# Patient Record
Sex: FEMALE | Race: WHITE | Employment: OTHER | URBAN - METROPOLITAN AREA
[De-identification: names, ages, dates, MRNs, and addresses within clinical notes are randomized per-mention and may not be internally consistent; named-entity substitution may affect disease eponyms.]

---

## 2017-02-09 RX ORDER — LISINOPRIL 20 MG/1
TABLET ORAL
Qty: 90 TABLET | Refills: 1 | Status: SHIPPED | OUTPATIENT
Start: 2017-02-09 | End: 2017-08-30 | Stop reason: SDUPTHER

## 2017-02-09 RX ORDER — PIOGLITAZONEHYDROCHLORIDE 30 MG/1
TABLET ORAL
Qty: 90 TABLET | Refills: 1 | Status: SHIPPED | OUTPATIENT
Start: 2017-02-09 | End: 2018-03-30 | Stop reason: SDUPTHER

## 2017-02-15 ENCOUNTER — NURSE ONLY (OUTPATIENT)
Dept: FAMILY MEDICINE CLINIC | Age: 82
End: 2017-02-15

## 2017-02-15 DIAGNOSIS — D72.810 LYMPHOCYTOPENIA: ICD-10-CM

## 2017-02-15 DIAGNOSIS — E53.8 B12 DEFICIENCY: ICD-10-CM

## 2017-02-15 DIAGNOSIS — E55.9 VITAMIN D DEFICIENCY DISEASE: ICD-10-CM

## 2017-02-15 DIAGNOSIS — E11.9 TYPE 2 DIABETES MELLITUS WITHOUT COMPLICATION, WITHOUT LONG-TERM CURRENT USE OF INSULIN (HCC): ICD-10-CM

## 2017-02-15 LAB
A/G RATIO: 1.5 (ref 1.1–2.2)
ALBUMIN SERPL-MCNC: 3.9 G/DL (ref 3.4–5)
ALP BLD-CCNC: 42 U/L (ref 40–129)
ALT SERPL-CCNC: 16 U/L (ref 10–40)
ANION GAP SERPL CALCULATED.3IONS-SCNC: 14 MMOL/L (ref 3–16)
AST SERPL-CCNC: 13 U/L (ref 15–37)
BASOPHILS ABSOLUTE: 0 K/UL (ref 0–0.2)
BASOPHILS RELATIVE PERCENT: 0.5 %
BILIRUB SERPL-MCNC: <0.2 MG/DL (ref 0–1)
BUN BLDV-MCNC: 16 MG/DL (ref 7–20)
CALCIUM SERPL-MCNC: 9.7 MG/DL (ref 8.3–10.6)
CHLORIDE BLD-SCNC: 101 MMOL/L (ref 99–110)
CHOLESTEROL, TOTAL: 107 MG/DL (ref 0–199)
CO2: 25 MMOL/L (ref 21–32)
CREAT SERPL-MCNC: 0.9 MG/DL (ref 0.6–1.2)
EOSINOPHILS ABSOLUTE: 0.1 K/UL (ref 0–0.6)
EOSINOPHILS RELATIVE PERCENT: 2.8 %
GFR AFRICAN AMERICAN: >60
GFR NON-AFRICAN AMERICAN: >60
GLOBULIN: 2.6 G/DL
GLUCOSE BLD-MCNC: 174 MG/DL (ref 70–99)
HCT VFR BLD CALC: 36.9 % (ref 36–48)
HDLC SERPL-MCNC: 46 MG/DL (ref 40–60)
HEMOGLOBIN: 12 G/DL (ref 12–16)
LDL CHOLESTEROL CALCULATED: 44 MG/DL
LYMPHOCYTES ABSOLUTE: 0.8 K/UL (ref 1–5.1)
LYMPHOCYTES RELATIVE PERCENT: 23.3 %
MCH RBC QN AUTO: 30.4 PG (ref 26–34)
MCHC RBC AUTO-ENTMCNC: 32.5 G/DL (ref 31–36)
MCV RBC AUTO: 93.7 FL (ref 80–100)
MONOCYTES ABSOLUTE: 0.3 K/UL (ref 0–1.3)
MONOCYTES RELATIVE PERCENT: 8.2 %
NEUTROPHILS ABSOLUTE: 2.3 K/UL (ref 1.7–7.7)
NEUTROPHILS RELATIVE PERCENT: 65.2 %
PDW BLD-RTO: 13.8 % (ref 12.4–15.4)
PLATELET # BLD: 181 K/UL (ref 135–450)
PMV BLD AUTO: 10.4 FL (ref 5–10.5)
POTASSIUM SERPL-SCNC: 5.3 MMOL/L (ref 3.5–5.1)
RBC # BLD: 3.94 M/UL (ref 4–5.2)
SODIUM BLD-SCNC: 140 MMOL/L (ref 136–145)
TOTAL PROTEIN: 6.5 G/DL (ref 6.4–8.2)
TRIGL SERPL-MCNC: 83 MG/DL (ref 0–150)
VITAMIN B-12: 298 PG/ML (ref 211–911)
VITAMIN D 25-HYDROXY: 26.8 NG/ML
VLDLC SERPL CALC-MCNC: 17 MG/DL
WBC # BLD: 3.5 K/UL (ref 4–11)

## 2017-02-15 PROCEDURE — 36415 COLL VENOUS BLD VENIPUNCTURE: CPT | Performed by: FAMILY MEDICINE

## 2017-02-16 LAB
ESTIMATED AVERAGE GLUCOSE: 168.6 MG/DL
HBA1C MFR BLD: 7.5 %

## 2017-02-20 ENCOUNTER — OFFICE VISIT (OUTPATIENT)
Dept: FAMILY MEDICINE CLINIC | Age: 82
End: 2017-02-20

## 2017-02-20 VITALS
WEIGHT: 161 LBS | DIASTOLIC BLOOD PRESSURE: 72 MMHG | HEART RATE: 62 BPM | BODY MASS INDEX: 26.82 KG/M2 | HEIGHT: 65 IN | SYSTOLIC BLOOD PRESSURE: 128 MMHG | OXYGEN SATURATION: 98 % | TEMPERATURE: 97.8 F

## 2017-02-20 DIAGNOSIS — J20.9 ACUTE BRONCHITIS, UNSPECIFIED ORGANISM: ICD-10-CM

## 2017-02-20 DIAGNOSIS — D72.810 LYMPHOCYTOPENIA: ICD-10-CM

## 2017-02-20 DIAGNOSIS — I10 BENIGN ESSENTIAL HYPERTENSION: ICD-10-CM

## 2017-02-20 DIAGNOSIS — E11.9 TYPE 2 DIABETES MELLITUS WITHOUT COMPLICATION, WITHOUT LONG-TERM CURRENT USE OF INSULIN (HCC): Primary | ICD-10-CM

## 2017-02-20 DIAGNOSIS — E78.00 PURE HYPERCHOLESTEROLEMIA: ICD-10-CM

## 2017-02-20 DIAGNOSIS — F51.04 INSOMNIA, PSYCHOPHYSIOLOGICAL: ICD-10-CM

## 2017-02-20 DIAGNOSIS — E55.9 VITAMIN D DEFICIENCY DISEASE: ICD-10-CM

## 2017-02-20 PROCEDURE — 99214 OFFICE O/P EST MOD 30 MIN: CPT | Performed by: FAMILY MEDICINE

## 2017-02-20 PROCEDURE — G8510 SCR DEP NEG, NO PLAN REQD: HCPCS | Performed by: FAMILY MEDICINE

## 2017-02-20 RX ORDER — AZITHROMYCIN 250 MG/1
TABLET, FILM COATED ORAL
Qty: 1 PACKET | Refills: 0 | Status: SHIPPED | OUTPATIENT
Start: 2017-02-20 | End: 2017-03-02

## 2017-02-20 ASSESSMENT — PATIENT HEALTH QUESTIONNAIRE - PHQ9
SUM OF ALL RESPONSES TO PHQ QUESTIONS 1-9: 0
2. FEELING DOWN, DEPRESSED OR HOPELESS: 0
1. LITTLE INTEREST OR PLEASURE IN DOING THINGS: 0
SUM OF ALL RESPONSES TO PHQ9 QUESTIONS 1 & 2: 0

## 2017-03-03 RX ORDER — SIMVASTATIN 20 MG
TABLET ORAL
Qty: 90 TABLET | Refills: 1 | Status: SHIPPED | OUTPATIENT
Start: 2017-03-03 | End: 2017-10-02 | Stop reason: SDUPTHER

## 2017-05-23 RX ORDER — ATENOLOL 25 MG/1
TABLET ORAL
Qty: 90 TABLET | Refills: 1 | Status: SHIPPED | OUTPATIENT
Start: 2017-05-23 | End: 2017-12-07 | Stop reason: SDUPTHER

## 2017-05-23 RX ORDER — GLIMEPIRIDE 2 MG/1
TABLET ORAL
Qty: 180 TABLET | Refills: 1 | Status: SHIPPED | OUTPATIENT
Start: 2017-05-23 | End: 2017-12-07 | Stop reason: SDUPTHER

## 2017-06-13 ENCOUNTER — NURSE ONLY (OUTPATIENT)
Dept: FAMILY MEDICINE CLINIC | Age: 82
End: 2017-06-13

## 2017-06-13 DIAGNOSIS — D72.810 LYMPHOCYTOPENIA: ICD-10-CM

## 2017-06-13 DIAGNOSIS — E11.9 TYPE 2 DIABETES MELLITUS WITHOUT COMPLICATION, WITHOUT LONG-TERM CURRENT USE OF INSULIN (HCC): ICD-10-CM

## 2017-06-13 DIAGNOSIS — E55.9 VITAMIN D DEFICIENCY DISEASE: ICD-10-CM

## 2017-06-13 LAB
BASOPHILS ABSOLUTE: 0 K/UL (ref 0–0.2)
BASOPHILS RELATIVE PERCENT: 1 %
EOSINOPHILS ABSOLUTE: 0.1 K/UL (ref 0–0.6)
EOSINOPHILS RELATIVE PERCENT: 1.8 %
HCT VFR BLD CALC: 38.3 % (ref 36–48)
HEMOGLOBIN: 12.1 G/DL (ref 12–16)
LYMPHOCYTES ABSOLUTE: 0.9 K/UL (ref 1–5.1)
LYMPHOCYTES RELATIVE PERCENT: 20.4 %
MCH RBC QN AUTO: 30.5 PG (ref 26–34)
MCHC RBC AUTO-ENTMCNC: 31.7 G/DL (ref 31–36)
MCV RBC AUTO: 96.5 FL (ref 80–100)
MONOCYTES ABSOLUTE: 0.3 K/UL (ref 0–1.3)
MONOCYTES RELATIVE PERCENT: 6 %
NEUTROPHILS ABSOLUTE: 3 K/UL (ref 1.7–7.7)
NEUTROPHILS RELATIVE PERCENT: 70.8 %
PDW BLD-RTO: 14.1 % (ref 12.4–15.4)
PLATELET # BLD: 194 K/UL (ref 135–450)
PMV BLD AUTO: 10.5 FL (ref 5–10.5)
RBC # BLD: 3.97 M/UL (ref 4–5.2)
VITAMIN B-12: 292 PG/ML (ref 211–911)
VITAMIN D 25-HYDROXY: 26.9 NG/ML
WBC # BLD: 4.2 K/UL (ref 4–11)

## 2017-06-13 PROCEDURE — 36415 COLL VENOUS BLD VENIPUNCTURE: CPT | Performed by: FAMILY MEDICINE

## 2017-06-14 LAB
ESTIMATED AVERAGE GLUCOSE: 159.9 MG/DL
HBA1C MFR BLD: 7.2 %

## 2017-06-19 ENCOUNTER — OFFICE VISIT (OUTPATIENT)
Dept: FAMILY MEDICINE CLINIC | Age: 82
End: 2017-06-19

## 2017-06-19 VITALS
HEIGHT: 65 IN | TEMPERATURE: 97.8 F | OXYGEN SATURATION: 96 % | HEART RATE: 68 BPM | BODY MASS INDEX: 27.49 KG/M2 | WEIGHT: 165 LBS | DIASTOLIC BLOOD PRESSURE: 70 MMHG | SYSTOLIC BLOOD PRESSURE: 132 MMHG

## 2017-06-19 DIAGNOSIS — E55.9 VITAMIN D DEFICIENCY DISEASE: ICD-10-CM

## 2017-06-19 DIAGNOSIS — I10 BENIGN ESSENTIAL HYPERTENSION: Primary | ICD-10-CM

## 2017-06-19 DIAGNOSIS — E11.9 TYPE 2 DIABETES MELLITUS WITHOUT COMPLICATION, WITHOUT LONG-TERM CURRENT USE OF INSULIN (HCC): ICD-10-CM

## 2017-06-19 DIAGNOSIS — E53.8 B12 DEFICIENCY: ICD-10-CM

## 2017-06-19 DIAGNOSIS — F51.04 INSOMNIA, PSYCHOPHYSIOLOGICAL: ICD-10-CM

## 2017-06-19 PROCEDURE — 3288F FALL RISK ASSESSMENT DOCD: CPT | Performed by: FAMILY MEDICINE

## 2017-06-19 PROCEDURE — 99214 OFFICE O/P EST MOD 30 MIN: CPT | Performed by: FAMILY MEDICINE

## 2017-08-14 ENCOUNTER — OFFICE VISIT (OUTPATIENT)
Dept: FAMILY MEDICINE CLINIC | Age: 82
End: 2017-08-14

## 2017-08-14 VITALS
HEART RATE: 80 BPM | SYSTOLIC BLOOD PRESSURE: 136 MMHG | DIASTOLIC BLOOD PRESSURE: 62 MMHG | BODY MASS INDEX: 28.05 KG/M2 | OXYGEN SATURATION: 93 % | WEIGHT: 166 LBS | TEMPERATURE: 97.6 F

## 2017-08-14 DIAGNOSIS — N30.00 ACUTE CYSTITIS WITHOUT HEMATURIA: ICD-10-CM

## 2017-08-14 DIAGNOSIS — R55 SYNCOPE, UNSPECIFIED SYNCOPE TYPE: ICD-10-CM

## 2017-08-14 DIAGNOSIS — E11.9 TYPE 2 DIABETES MELLITUS WITHOUT COMPLICATION, WITHOUT LONG-TERM CURRENT USE OF INSULIN (HCC): ICD-10-CM

## 2017-08-14 DIAGNOSIS — D62 ACUTE BLOOD LOSS ANEMIA: ICD-10-CM

## 2017-08-14 DIAGNOSIS — K92.2 GASTROINTESTINAL HEMORRHAGE, UNSPECIFIED GASTROINTESTINAL HEMORRHAGE TYPE: Primary | ICD-10-CM

## 2017-08-14 DIAGNOSIS — R26.89 IMBALANCE: ICD-10-CM

## 2017-08-14 LAB
BASOPHILS ABSOLUTE: 0 K/UL (ref 0–0.2)
BASOPHILS RELATIVE PERCENT: 0.5 %
EOSINOPHILS ABSOLUTE: 0.1 K/UL (ref 0–0.6)
EOSINOPHILS RELATIVE PERCENT: 1.8 %
HCT VFR BLD CALC: 34.8 % (ref 36–48)
HEMOGLOBIN: 11.5 G/DL (ref 12–16)
LYMPHOCYTES ABSOLUTE: 1.4 K/UL (ref 1–5.1)
LYMPHOCYTES RELATIVE PERCENT: 30.5 %
MCH RBC QN AUTO: 31.1 PG (ref 26–34)
MCHC RBC AUTO-ENTMCNC: 33.2 G/DL (ref 31–36)
MCV RBC AUTO: 93.6 FL (ref 80–100)
MONOCYTES ABSOLUTE: 0.6 K/UL (ref 0–1.3)
MONOCYTES RELATIVE PERCENT: 12.9 %
NEUTROPHILS ABSOLUTE: 2.5 K/UL (ref 1.7–7.7)
NEUTROPHILS RELATIVE PERCENT: 54.3 %
PDW BLD-RTO: 13.6 % (ref 12.4–15.4)
PLATELET # BLD: 233 K/UL (ref 135–450)
PMV BLD AUTO: 10 FL (ref 5–10.5)
RBC # BLD: 3.72 M/UL (ref 4–5.2)
WBC # BLD: 4.5 K/UL (ref 4–11)

## 2017-08-14 PROCEDURE — 36415 COLL VENOUS BLD VENIPUNCTURE: CPT | Performed by: FAMILY MEDICINE

## 2017-08-14 PROCEDURE — 99214 OFFICE O/P EST MOD 30 MIN: CPT | Performed by: FAMILY MEDICINE

## 2017-08-14 RX ORDER — OMEPRAZOLE 20 MG/1
20 CAPSULE, DELAYED RELEASE ORAL DAILY
COMMUNITY
Start: 2017-08-10 | End: 2017-10-23 | Stop reason: ALTCHOICE

## 2017-08-16 ENCOUNTER — TELEPHONE (OUTPATIENT)
Dept: FAMILY MEDICINE CLINIC | Age: 82
End: 2017-08-16

## 2017-08-16 RX ORDER — FERROUS SULFATE 325(65) MG
325 TABLET ORAL
Qty: 30 TABLET | Refills: 0 | COMMUNITY
Start: 2017-08-16 | End: 2018-02-27 | Stop reason: ALTCHOICE

## 2017-08-28 ENCOUNTER — TELEPHONE (OUTPATIENT)
Dept: FAMILY MEDICINE CLINIC | Age: 82
End: 2017-08-28

## 2017-08-28 DIAGNOSIS — R26.89 IMBALANCE: ICD-10-CM

## 2017-08-28 DIAGNOSIS — R26.81 UNSTEADY GAIT: Primary | ICD-10-CM

## 2017-08-30 RX ORDER — LISINOPRIL 20 MG/1
TABLET ORAL
Qty: 90 TABLET | Refills: 1 | Status: SHIPPED | OUTPATIENT
Start: 2017-08-30 | End: 2018-02-26 | Stop reason: SDUPTHER

## 2017-08-31 ENCOUNTER — HOSPITAL ENCOUNTER (OUTPATIENT)
Dept: OTHER | Age: 82
Discharge: HOME | End: 2017-08-31
Attending: INTERNAL MEDICINE | Admitting: INTERNAL MEDICINE

## 2017-08-31 VITALS
SYSTOLIC BLOOD PRESSURE: 123 MMHG | HEIGHT: 66 IN | OXYGEN SATURATION: 93 % | WEIGHT: 166 LBS | HEART RATE: 64 BPM | TEMPERATURE: 97.9 F | RESPIRATION RATE: 16 BRPM | DIASTOLIC BLOOD PRESSURE: 59 MMHG | BODY MASS INDEX: 26.68 KG/M2

## 2017-08-31 LAB
C DIFFICILE TOXIN, EIA: NORMAL
GLUCOSE BLD-MCNC: 131 MG/DL (ref 70–99)
GLUCOSE BLD-MCNC: 141 MG/DL (ref 70–99)
PERFORMED ON: ABNORMAL
PERFORMED ON: ABNORMAL

## 2017-08-31 RX ORDER — SODIUM CHLORIDE, SODIUM LACTATE, POTASSIUM CHLORIDE, CALCIUM CHLORIDE 600; 310; 30; 20 MG/100ML; MG/100ML; MG/100ML; MG/100ML
INJECTION, SOLUTION INTRAVENOUS CONTINUOUS
Status: DISCONTINUED | OUTPATIENT
Start: 2017-08-31 | End: 2017-09-01 | Stop reason: HOSPADM

## 2017-08-31 RX ADMIN — SODIUM CHLORIDE, SODIUM LACTATE, POTASSIUM CHLORIDE, CALCIUM CHLORIDE: 600; 310; 30; 20 INJECTION, SOLUTION INTRAVENOUS at 06:41

## 2017-08-31 ASSESSMENT — PAIN - FUNCTIONAL ASSESSMENT: PAIN_FUNCTIONAL_ASSESSMENT: 0-10

## 2017-10-02 RX ORDER — SIMVASTATIN 20 MG
TABLET ORAL
Qty: 90 TABLET | Refills: 1 | Status: SHIPPED | OUTPATIENT
Start: 2017-10-02 | End: 2018-04-23 | Stop reason: SDUPTHER

## 2017-10-19 ENCOUNTER — NURSE ONLY (OUTPATIENT)
Dept: FAMILY MEDICINE CLINIC | Age: 82
End: 2017-10-19

## 2017-10-19 DIAGNOSIS — E11.9 TYPE 2 DIABETES MELLITUS WITHOUT COMPLICATION, WITHOUT LONG-TERM CURRENT USE OF INSULIN (HCC): ICD-10-CM

## 2017-10-19 LAB
A/G RATIO: 1.5 (ref 1.1–2.2)
ALBUMIN SERPL-MCNC: 4.1 G/DL (ref 3.4–5)
ALP BLD-CCNC: 44 U/L (ref 40–129)
ALT SERPL-CCNC: 13 U/L (ref 10–40)
ANION GAP SERPL CALCULATED.3IONS-SCNC: 13 MMOL/L (ref 3–16)
AST SERPL-CCNC: 11 U/L (ref 15–37)
BILIRUB SERPL-MCNC: <0.2 MG/DL (ref 0–1)
BUN BLDV-MCNC: 19 MG/DL (ref 7–20)
CALCIUM SERPL-MCNC: 9.6 MG/DL (ref 8.3–10.6)
CHLORIDE BLD-SCNC: 98 MMOL/L (ref 99–110)
CHOLESTEROL, TOTAL: 159 MG/DL (ref 0–199)
CO2: 27 MMOL/L (ref 21–32)
CREAT SERPL-MCNC: 0.7 MG/DL (ref 0.6–1.2)
GFR AFRICAN AMERICAN: >60
GFR NON-AFRICAN AMERICAN: >60
GLOBULIN: 2.7 G/DL
GLUCOSE BLD-MCNC: 166 MG/DL (ref 70–99)
HDLC SERPL-MCNC: 48 MG/DL (ref 40–60)
LDL CHOLESTEROL CALCULATED: 74 MG/DL
POTASSIUM SERPL-SCNC: 4.5 MMOL/L (ref 3.5–5.1)
SODIUM BLD-SCNC: 138 MMOL/L (ref 136–145)
TOTAL PROTEIN: 6.8 G/DL (ref 6.4–8.2)
TRIGL SERPL-MCNC: 186 MG/DL (ref 0–150)
VLDLC SERPL CALC-MCNC: 37 MG/DL

## 2017-10-19 PROCEDURE — 36415 COLL VENOUS BLD VENIPUNCTURE: CPT | Performed by: FAMILY MEDICINE

## 2017-10-20 LAB
ESTIMATED AVERAGE GLUCOSE: 180 MG/DL
HBA1C MFR BLD: 7.9 %

## 2017-10-23 ENCOUNTER — OFFICE VISIT (OUTPATIENT)
Dept: FAMILY MEDICINE CLINIC | Age: 82
End: 2017-10-23

## 2017-10-23 VITALS
BODY MASS INDEX: 27.14 KG/M2 | SYSTOLIC BLOOD PRESSURE: 135 MMHG | HEART RATE: 72 BPM | DIASTOLIC BLOOD PRESSURE: 64 MMHG | HEIGHT: 64 IN | WEIGHT: 159 LBS

## 2017-10-23 DIAGNOSIS — F51.04 INSOMNIA, PSYCHOPHYSIOLOGICAL: ICD-10-CM

## 2017-10-23 DIAGNOSIS — E55.9 VITAMIN D DEFICIENCY DISEASE: ICD-10-CM

## 2017-10-23 DIAGNOSIS — K52.9 COLITIS: ICD-10-CM

## 2017-10-23 DIAGNOSIS — D48.5 NEOPLASM OF UNCERTAIN BEHAVIOR OF SKIN: ICD-10-CM

## 2017-10-23 DIAGNOSIS — E78.00 PURE HYPERCHOLESTEROLEMIA: ICD-10-CM

## 2017-10-23 DIAGNOSIS — I10 BENIGN ESSENTIAL HYPERTENSION: ICD-10-CM

## 2017-10-23 DIAGNOSIS — E11.9 TYPE 2 DIABETES MELLITUS WITHOUT COMPLICATION, WITHOUT LONG-TERM CURRENT USE OF INSULIN (HCC): Primary | ICD-10-CM

## 2017-10-23 PROCEDURE — 99214 OFFICE O/P EST MOD 30 MIN: CPT | Performed by: FAMILY MEDICINE

## 2017-10-23 NOTE — PROGRESS NOTES
Assessment and plan  1. Type 2 diabetes mellitus without complication, without long-term current use of insulin ([facility]) Stable     2. Benign essential hypertension Stable     3. Colitis Resolved     4. Insomnia, psychophysiological Resolved     5. Neoplasm of uncertain behavior of skin  Return for excision and biopsy    6. Pure hypercholesterolemiastable    Healthy Family prevention recommendations given. Continue all current prescription medications as listed below. RTC 4 months or sooner prn. She will have a CBC, A1c, B12, and vitamin D level drawn prior to that visit    Subjective  Patient returns for reevaluation of her medical problems including diabetes, hypertension, insomnia, hypercholesterolemia, and colitis. Her recent A1c was 7.9%. Her blood pressures been good. Her recent lipid was at goal.  Her insomnia has resolved and she is no longer needing any trazodone. Her foot exam was done at last visit but was not recorded in health maintenance. She is concerned about a lesion on her left upper chest.  It does get irritated and she believes it is enlarging. She was referred to home care for physical therapy. She cannot leave the house without assistance from her daughters. She reports the physical therapy is definitely helping to strengthen her legs. Social History   Substance Use Topics    Smoking status: Never Smoker    Smokeless tobacco: Never Used    Alcohol use No     Past history:  She did have her colonoscopy on August 31. It showed evidence of resolving sigmoid colitis. She's had no further bowel trouble. She saw her eye doctor in January.     Review of systems:  Constitutional:  fatigue - no                                                  abnormal weight loss - no  Neurologic:  dizziness - no                       syncope - no  Cardiac:  chest pain or discomfort - no                 orthopnea or PND - no  Respiratory: wheezing - no                       dyspnea on

## 2017-10-30 ENCOUNTER — TELEPHONE (OUTPATIENT)
Dept: FAMILY MEDICINE CLINIC | Age: 82
End: 2017-10-30

## 2017-12-07 RX ORDER — ATENOLOL 25 MG/1
TABLET ORAL
Qty: 90 TABLET | Refills: 1 | Status: SHIPPED | OUTPATIENT
Start: 2017-12-07 | End: 2018-05-25 | Stop reason: SDUPTHER

## 2017-12-07 RX ORDER — GLIMEPIRIDE 2 MG/1
TABLET ORAL
Qty: 180 TABLET | Refills: 1 | Status: SHIPPED | OUTPATIENT
Start: 2017-12-07 | End: 2018-06-18 | Stop reason: SDUPTHER

## 2017-12-18 ENCOUNTER — TELEPHONE (OUTPATIENT)
Dept: FAMILY MEDICINE CLINIC | Age: 82
End: 2017-12-18

## 2018-02-22 ENCOUNTER — NURSE ONLY (OUTPATIENT)
Dept: FAMILY MEDICINE CLINIC | Age: 83
End: 2018-02-22

## 2018-02-22 DIAGNOSIS — E55.9 VITAMIN D DEFICIENCY DISEASE: ICD-10-CM

## 2018-02-22 DIAGNOSIS — E11.9 TYPE 2 DIABETES MELLITUS WITHOUT COMPLICATION, WITHOUT LONG-TERM CURRENT USE OF INSULIN (HCC): ICD-10-CM

## 2018-02-22 LAB
BASOPHILS ABSOLUTE: 0 K/UL (ref 0–0.2)
BASOPHILS RELATIVE PERCENT: 0.7 %
EOSINOPHILS ABSOLUTE: 0.1 K/UL (ref 0–0.6)
EOSINOPHILS RELATIVE PERCENT: 2.3 %
HCT VFR BLD CALC: 36.3 % (ref 36–48)
HEMOGLOBIN: 12.2 G/DL (ref 12–16)
LYMPHOCYTES ABSOLUTE: 1.1 K/UL (ref 1–5.1)
LYMPHOCYTES RELATIVE PERCENT: 24.3 %
MCH RBC QN AUTO: 31.4 PG (ref 26–34)
MCHC RBC AUTO-ENTMCNC: 33.7 G/DL (ref 31–36)
MCV RBC AUTO: 93.2 FL (ref 80–100)
MONOCYTES ABSOLUTE: 0.3 K/UL (ref 0–1.3)
MONOCYTES RELATIVE PERCENT: 6.2 %
NEUTROPHILS ABSOLUTE: 2.9 K/UL (ref 1.7–7.7)
NEUTROPHILS RELATIVE PERCENT: 66.5 %
PDW BLD-RTO: 14.5 % (ref 12.4–15.4)
PLATELET # BLD: 185 K/UL (ref 135–450)
PMV BLD AUTO: 10.3 FL (ref 5–10.5)
RBC # BLD: 3.89 M/UL (ref 4–5.2)
VITAMIN B-12: 221 PG/ML (ref 211–911)
VITAMIN D 25-HYDROXY: 23.1 NG/ML
WBC # BLD: 4.4 K/UL (ref 4–11)

## 2018-02-22 PROCEDURE — 36415 COLL VENOUS BLD VENIPUNCTURE: CPT | Performed by: FAMILY MEDICINE

## 2018-02-23 LAB
ESTIMATED AVERAGE GLUCOSE: 180 MG/DL
HBA1C MFR BLD: 7.9 %

## 2018-02-26 RX ORDER — LISINOPRIL 20 MG/1
TABLET ORAL
Qty: 90 TABLET | Refills: 1 | Status: SHIPPED | OUTPATIENT
Start: 2018-02-26 | End: 2018-07-09 | Stop reason: SDUPTHER

## 2018-02-27 ENCOUNTER — OFFICE VISIT (OUTPATIENT)
Dept: FAMILY MEDICINE CLINIC | Age: 83
End: 2018-02-27

## 2018-02-27 VITALS
BODY MASS INDEX: 28.78 KG/M2 | TEMPERATURE: 97.6 F | HEART RATE: 72 BPM | DIASTOLIC BLOOD PRESSURE: 60 MMHG | WEIGHT: 169 LBS | OXYGEN SATURATION: 97 % | SYSTOLIC BLOOD PRESSURE: 126 MMHG

## 2018-02-27 DIAGNOSIS — D72.810 LYMPHOCYTOPENIA: ICD-10-CM

## 2018-02-27 DIAGNOSIS — I10 BENIGN ESSENTIAL HYPERTENSION: ICD-10-CM

## 2018-02-27 DIAGNOSIS — E11.9 TYPE 2 DIABETES MELLITUS WITHOUT COMPLICATION, WITHOUT LONG-TERM CURRENT USE OF INSULIN (HCC): Primary | ICD-10-CM

## 2018-02-27 DIAGNOSIS — E53.8 B12 DEFICIENCY: ICD-10-CM

## 2018-02-27 DIAGNOSIS — E55.9 VITAMIN D DEFICIENCY DISEASE: ICD-10-CM

## 2018-02-27 PROCEDURE — G8510 SCR DEP NEG, NO PLAN REQD: HCPCS | Performed by: FAMILY MEDICINE

## 2018-02-27 PROCEDURE — 99214 OFFICE O/P EST MOD 30 MIN: CPT | Performed by: FAMILY MEDICINE

## 2018-02-27 RX ORDER — ACETAMINOPHEN 160 MG
2000 TABLET,DISINTEGRATING ORAL DAILY
Qty: 1 CAPSULE | Refills: 0 | Status: ON HOLD
Start: 2018-02-27 | End: 2019-01-20 | Stop reason: ALTCHOICE

## 2018-02-27 ASSESSMENT — PATIENT HEALTH QUESTIONNAIRE - PHQ9
SUM OF ALL RESPONSES TO PHQ9 QUESTIONS 1 & 2: 0
1. LITTLE INTEREST OR PLEASURE IN DOING THINGS: 0
SUM OF ALL RESPONSES TO PHQ QUESTIONS 1-9: 0
2. FEELING DOWN, DEPRESSED OR HOPELESS: 0

## 2018-03-31 RX ORDER — PIOGLITAZONEHYDROCHLORIDE 30 MG/1
TABLET ORAL
Qty: 90 TABLET | Refills: 1 | Status: SHIPPED | OUTPATIENT
Start: 2018-03-31 | End: 2018-07-09 | Stop reason: SDUPTHER

## 2018-04-23 RX ORDER — SIMVASTATIN 20 MG
TABLET ORAL
Qty: 90 TABLET | Refills: 1 | Status: SHIPPED | OUTPATIENT
Start: 2018-04-23 | End: 2018-07-09 | Stop reason: SDUPTHER

## 2018-04-24 RX ORDER — TRAZODONE HYDROCHLORIDE 50 MG/1
TABLET ORAL
Qty: 60 TABLET | Refills: 1 | OUTPATIENT
Start: 2018-04-24

## 2018-05-25 RX ORDER — ATENOLOL 25 MG/1
TABLET ORAL
Qty: 90 TABLET | Refills: 1 | Status: SHIPPED | OUTPATIENT
Start: 2018-05-25 | End: 2018-07-09 | Stop reason: SDUPTHER

## 2018-06-18 RX ORDER — GLIMEPIRIDE 2 MG/1
TABLET ORAL
Qty: 180 TABLET | Refills: 1 | Status: SHIPPED | OUTPATIENT
Start: 2018-06-18 | End: 2018-12-20 | Stop reason: SDUPTHER

## 2018-07-09 ENCOUNTER — OFFICE VISIT (OUTPATIENT)
Dept: FAMILY MEDICINE CLINIC | Age: 83
End: 2018-07-09

## 2018-07-09 VITALS
WEIGHT: 173.6 LBS | BODY MASS INDEX: 29.57 KG/M2 | DIASTOLIC BLOOD PRESSURE: 60 MMHG | TEMPERATURE: 97.4 F | SYSTOLIC BLOOD PRESSURE: 128 MMHG | HEART RATE: 70 BPM | OXYGEN SATURATION: 97 %

## 2018-07-09 DIAGNOSIS — I10 BENIGN ESSENTIAL HYPERTENSION: ICD-10-CM

## 2018-07-09 DIAGNOSIS — F51.04 PSYCHOPHYSIOLOGIC INSOMNIA: ICD-10-CM

## 2018-07-09 DIAGNOSIS — E11.9 TYPE 2 DIABETES MELLITUS WITHOUT COMPLICATION, WITHOUT LONG-TERM CURRENT USE OF INSULIN (HCC): Primary | ICD-10-CM

## 2018-07-09 DIAGNOSIS — E55.9 VITAMIN D DEFICIENCY DISEASE: ICD-10-CM

## 2018-07-09 DIAGNOSIS — E78.00 PURE HYPERCHOLESTEROLEMIA: ICD-10-CM

## 2018-07-09 LAB
A/G RATIO: 1.6 (ref 1.1–2.2)
ALBUMIN SERPL-MCNC: 4.2 G/DL (ref 3.4–5)
ALP BLD-CCNC: 43 U/L (ref 40–129)
ALT SERPL-CCNC: 11 U/L (ref 10–40)
ANION GAP SERPL CALCULATED.3IONS-SCNC: 14 MMOL/L (ref 3–16)
AST SERPL-CCNC: 13 U/L (ref 15–37)
BASOPHILS ABSOLUTE: 0 K/UL (ref 0–0.2)
BASOPHILS RELATIVE PERCENT: 1 %
BILIRUB SERPL-MCNC: <0.2 MG/DL (ref 0–1)
BUN BLDV-MCNC: 22 MG/DL (ref 7–20)
CALCIUM SERPL-MCNC: 9.8 MG/DL (ref 8.3–10.6)
CHLORIDE BLD-SCNC: 102 MMOL/L (ref 99–110)
CHOLESTEROL, TOTAL: 136 MG/DL (ref 0–199)
CO2: 25 MMOL/L (ref 21–32)
CREAT SERPL-MCNC: 0.9 MG/DL (ref 0.6–1.2)
EOSINOPHILS ABSOLUTE: 0.1 K/UL (ref 0–0.6)
EOSINOPHILS RELATIVE PERCENT: 1.5 %
GFR AFRICAN AMERICAN: >60
GFR NON-AFRICAN AMERICAN: >60
GLOBULIN: 2.7 G/DL
GLUCOSE BLD-MCNC: 164 MG/DL (ref 70–99)
HBA1C MFR BLD: 7.3 %
HCT VFR BLD CALC: 35.8 % (ref 36–48)
HDLC SERPL-MCNC: 56 MG/DL (ref 40–60)
HEMOGLOBIN: 12.2 G/DL (ref 12–16)
LDL CHOLESTEROL CALCULATED: 61 MG/DL
LYMPHOCYTES ABSOLUTE: 0.8 K/UL (ref 1–5.1)
LYMPHOCYTES RELATIVE PERCENT: 17.3 %
MCH RBC QN AUTO: 31.8 PG (ref 26–34)
MCHC RBC AUTO-ENTMCNC: 34.1 G/DL (ref 31–36)
MCV RBC AUTO: 93.4 FL (ref 80–100)
MONOCYTES ABSOLUTE: 0.2 K/UL (ref 0–1.3)
MONOCYTES RELATIVE PERCENT: 5 %
NEUTROPHILS ABSOLUTE: 3.6 K/UL (ref 1.7–7.7)
NEUTROPHILS RELATIVE PERCENT: 75.2 %
PDW BLD-RTO: 14.1 % (ref 12.4–15.4)
PLATELET # BLD: 187 K/UL (ref 135–450)
PMV BLD AUTO: 10.7 FL (ref 5–10.5)
POTASSIUM SERPL-SCNC: 5.1 MMOL/L (ref 3.5–5.1)
RBC # BLD: 3.83 M/UL (ref 4–5.2)
SODIUM BLD-SCNC: 141 MMOL/L (ref 136–145)
TOTAL PROTEIN: 6.9 G/DL (ref 6.4–8.2)
TRIGL SERPL-MCNC: 96 MG/DL (ref 0–150)
VITAMIN D 25-HYDROXY: 38.7 NG/ML
VLDLC SERPL CALC-MCNC: 19 MG/DL
WBC # BLD: 4.8 K/UL (ref 4–11)

## 2018-07-09 PROCEDURE — 83036 HEMOGLOBIN GLYCOSYLATED A1C: CPT | Performed by: FAMILY MEDICINE

## 2018-07-09 PROCEDURE — 99214 OFFICE O/P EST MOD 30 MIN: CPT | Performed by: FAMILY MEDICINE

## 2018-07-09 PROCEDURE — 36415 COLL VENOUS BLD VENIPUNCTURE: CPT | Performed by: FAMILY MEDICINE

## 2018-07-09 RX ORDER — ATENOLOL 25 MG/1
TABLET ORAL
Qty: 90 TABLET | Refills: 1 | Status: SHIPPED | OUTPATIENT
Start: 2018-07-09 | End: 2019-06-16 | Stop reason: SDUPTHER

## 2018-07-09 RX ORDER — PIOGLITAZONEHYDROCHLORIDE 30 MG/1
TABLET ORAL
Qty: 90 TABLET | Refills: 1 | Status: SHIPPED | OUTPATIENT
Start: 2018-07-09 | End: 2019-04-16 | Stop reason: SDUPTHER

## 2018-07-09 RX ORDER — LISINOPRIL 20 MG/1
TABLET ORAL
Qty: 90 TABLET | Refills: 1 | Status: SHIPPED | OUTPATIENT
Start: 2018-07-09 | End: 2019-02-20 | Stop reason: SDUPTHER

## 2018-07-09 RX ORDER — SIMVASTATIN 20 MG
TABLET ORAL
Qty: 90 TABLET | Refills: 1 | Status: SHIPPED | OUTPATIENT
Start: 2018-07-09 | End: 2019-04-16 | Stop reason: SDUPTHER

## 2018-07-09 NOTE — PROGRESS NOTES
Assessment and plan  1. Type 2 diabetes mellitus without complication, without long-term current use of insulin (HCC)  Stable  - POCT glycosylated hemoglobin (Hb A1C)  -  DIABETES FOOT EXAM    2. Benign essential hypertension  Stable  - CBC Auto Differential  - Comprehensive Metabolic Panel    3. Pure hypercholesterolemia   - status pending result of lab  - Lipid Panel    4. Vitamin D deficiency disease   - status pending result of lab  - Vitamin D 25 Hydroxy    5. Psychophysiologic insomnia  Trial of melatonin 5 mg q.h.s. Healthy Family prevention recommendations given. Continue all current prescription medications as listed below. I recommended the Shingrix vaccine series. RTC 4 months or sooner prn. Subjective  Patient returns for reevaluation of her medical problems including diabetes, hypertension, vitamin D deficiency, hypercholesterolemia. A1c is 7.3%. Her blood pressures been doing very well. She is taking her vitamin D and tolerating her statin. She has a history of insomnia. She stopped her trazodone last year because she said her family was uncomfortable with her taking it. However she is still quite troubled by the insomnia. She  reports that she has never smoked. She has never used smokeless tobacco.  Allergies   Allergen Reactions    Morphine Nausea And Vomiting     Past history:  The patient reports she has not had other tests, been to the ER, or visits with a specialist since her last visit with me.     Review of systems:  Constitutional:  fatigue - no                                                  abnormal weight loss - no  Cardiac:  chest pain or discomfort - no                 lightheadedness - no  Respiratory: wheezing - no                       dyspnea on exertion - no            unusual cough - no  Gastrointestinal:  frequent heartburn- no                             dysphagia - no  Urologic:  Hematuria - no                   Dysuria - no    Objective  /60 3    Blood Glucose Monitoring Suppl (ONE TOUCH ULTRA 2) W/DEVICE KIT Dx: E11.9 1 kit 0    sitaGLIPtin (JANUVIA) 100 MG tablet Take 100 mg by mouth daily         The note was completed using Dragon voice recognition transcription. Every effort was made to ensure accuracy; however, inadvertent  transcription errors may be present despite my best efforts to edit errors.

## 2018-10-29 ENCOUNTER — APPOINTMENT (OUTPATIENT)
Dept: GENERAL RADIOLOGY | Age: 83
End: 2018-10-29
Payer: MEDICARE

## 2018-10-29 ENCOUNTER — HOSPITAL ENCOUNTER (EMERGENCY)
Age: 83
Discharge: HOME | End: 2018-10-29
Payer: MEDICARE

## 2018-10-29 ENCOUNTER — TELEPHONE (OUTPATIENT)
Dept: FAMILY MEDICINE CLINIC | Age: 83
End: 2018-10-29

## 2018-10-29 VITALS
SYSTOLIC BLOOD PRESSURE: 174 MMHG | OXYGEN SATURATION: 98 % | RESPIRATION RATE: 16 BRPM | HEART RATE: 77 BPM | HEIGHT: 65 IN | TEMPERATURE: 98.2 F | BODY MASS INDEX: 26.66 KG/M2 | DIASTOLIC BLOOD PRESSURE: 77 MMHG | WEIGHT: 160 LBS

## 2018-10-29 DIAGNOSIS — D71 GRANULOMATOUS DISEASE (HCC): ICD-10-CM

## 2018-10-29 DIAGNOSIS — S62.101A WRIST FRACTURE, CLOSED, RIGHT, INITIAL ENCOUNTER: Primary | ICD-10-CM

## 2018-10-29 PROCEDURE — 71045 X-RAY EXAM CHEST 1 VIEW: CPT

## 2018-10-29 PROCEDURE — 4500000023 HC ED LEVEL 3 PROCEDURE

## 2018-10-29 PROCEDURE — 6370000000 HC RX 637 (ALT 250 FOR IP): Performed by: PHYSICIAN ASSISTANT

## 2018-10-29 PROCEDURE — 99283 EMERGENCY DEPT VISIT LOW MDM: CPT

## 2018-10-29 PROCEDURE — 73110 X-RAY EXAM OF WRIST: CPT

## 2018-10-29 RX ORDER — HYDROCODONE BITARTRATE AND ACETAMINOPHEN 5; 325 MG/1; MG/1
1 TABLET ORAL EVERY 6 HOURS PRN
Qty: 12 TABLET | Refills: 0 | Status: SHIPPED | OUTPATIENT
Start: 2018-10-29 | End: 2018-11-01

## 2018-10-29 RX ORDER — HYDROCODONE BITARTRATE AND ACETAMINOPHEN 5; 325 MG/1; MG/1
1 TABLET ORAL ONCE
Status: COMPLETED | OUTPATIENT
Start: 2018-10-29 | End: 2018-10-29

## 2018-10-29 RX ADMIN — HYDROCODONE BITARTRATE AND ACETAMINOPHEN 1 TABLET: 5; 325 TABLET ORAL at 15:33

## 2018-10-29 ASSESSMENT — PAIN DESCRIPTION - PAIN TYPE: TYPE: ACUTE PAIN

## 2018-10-29 ASSESSMENT — PAIN SCALES - GENERAL: PAINLEVEL_OUTOF10: 8

## 2018-10-29 ASSESSMENT — ENCOUNTER SYMPTOMS
COUGH: 0
BACK PAIN: 0
COLOR CHANGE: 0
CHOKING: 0
VOMITING: 0
SHORTNESS OF BREATH: 0
ABDOMINAL PAIN: 0

## 2018-10-29 ASSESSMENT — PAIN DESCRIPTION - LOCATION: LOCATION: WRIST

## 2018-10-29 ASSESSMENT — PAIN DESCRIPTION - ORIENTATION: ORIENTATION: RIGHT

## 2018-10-29 NOTE — ED PROVIDER NOTES
Evaluated by AYDIN supervising physician available for consult    Patient was holding onto dog leash and sat down on bench and lost balance and fell off bench and into the pond her head went under for a second then she was able to keep afloat but could not get out on her own because she injured her right wrist during the fall, the neighbor's dog was barking and neighbor came out and helped her out of pond states was in there for about 10 minutes. Patient denies breathing in any water, no choking, no trouble breathing, no chest pain. Did not hit head, no LOC. No vomiting. No hip pain. No other injuries besides right wrist. Has been ambulatory. She went back to her house and took ibuprofen and granddaughter brought her in for evaluation. The history is provided by the patient. Wrist Problem   Location:  Wrist  Wrist location:  R wrist  Injury: yes    Mechanism of injury: fall    Pain details:     Onset quality:  Sudden    Timing:  Constant  Worsened by: Movement  Associated symptoms: decreased range of motion    Associated symptoms: no back pain, no fever, no neck pain and no numbness        Review of Systems   Constitutional: Negative for fever. Respiratory: Negative for cough, choking and shortness of breath. Cardiovascular: Negative for chest pain. Gastrointestinal: Negative for abdominal pain and vomiting. Musculoskeletal: Negative for back pain and neck pain. Rt wrist pain   Skin: Negative for color change and wound. Neurological: Negative for dizziness, syncope, weakness, numbness and headaches. PAST MEDICAL HISTORY   has a past medical history of Lower GI bleed (08/08/2017) and Subdural hematoma ([facility] [address] (12/14). PAST SURGICAL HISTORY   has a past surgical history that includes Tonsillectomy (1953); Carpal tunnel release (1991); Cataract removal (Bilateral); Colonoscopy (2007); and Colonoscopy (08/31/2017).     FAMILY HISTORY  family history includes Diabetes in her brother; Heart may represent a combination of old granulomatous disease and multifocal calcified pleural plaque. No pneumothorax. No free air. No confluent airspace consolidation or sizable pleural effusions. Atherosclerotic calcification of the thoracic aorta. Cardiac size within normal limits. Mild dextroconvex scoliosis of the thoracic spine. 1. Nodular and confluent calcifications projecting over the bilateral lungs may represent underlying old granulomatous disease and multifocal calcified pleural plaque which can be seen with asbestos related pleural disease. Consider further evaluation with dedicated CT chest. 2. No confluent airspace consolidation or sizable effusions identified. 3. Mild dextroconvex scoliosis of the thoracic spine. 3:47 PM  Impression right wrist fx, placed in splint, pain medication provided, referral to orthopedics. 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 Patient has no chest symptoms, no cough, no shortness of breath. She will be discharged home to follow this with her family doctor recommend outpatient CT chest for further evaluation. Patient in agreement. Advised returning to ER for any worsening or changes. Patient and granddaughter understand and agree. Case discussed with and xray reviewed by my attending Dr Santana Copeland who is in agreement with treatment plan and disposition. I estimate there is LOW risk for  PNEUMOTHORAX, HEMOTHORAX, PERICARDIAL TAMPONADE, CARDIAC CONTUSION, or a THORACIC AORTIC DISSECTION, thus I consider the discharge disposition reasonable. Also, there is no evidence or peritonitis, sepsis, or toxicity.   I estimate there is LOW risk for COMPARTMENT SYNDROME, DEEP VENOUS THROMBOSIS, SEPTIC ARTHRITIS, TENDON OR NEUROVASCULAR INJURY, thus I consider the discharge

## 2018-11-06 ENCOUNTER — OFFICE VISIT (OUTPATIENT)
Dept: ORTHOPEDIC SURGERY | Age: 83
End: 2018-11-06
Payer: MEDICARE

## 2018-11-06 ENCOUNTER — TELEPHONE (OUTPATIENT)
Dept: ORTHOPEDIC SURGERY | Age: 83
End: 2018-11-06

## 2018-11-06 VITALS — BODY MASS INDEX: 27.66 KG/M2 | WEIGHT: 166 LBS | HEIGHT: 65 IN

## 2018-11-06 DIAGNOSIS — S52.601A CLOSED FRACTURE OF DISTAL ENDS OF RIGHT RADIUS AND ULNA, INITIAL ENCOUNTER: ICD-10-CM

## 2018-11-06 DIAGNOSIS — S52.501A CLOSED FRACTURE OF DISTAL ENDS OF RIGHT RADIUS AND ULNA, INITIAL ENCOUNTER: ICD-10-CM

## 2018-11-06 DIAGNOSIS — S52.591A OTHER CLOSED FRACTURE OF DISTAL END OF RIGHT RADIUS, INITIAL ENCOUNTER: Primary | ICD-10-CM

## 2018-11-06 DIAGNOSIS — M25.531 RIGHT WRIST PAIN: Primary | ICD-10-CM

## 2018-11-06 PROCEDURE — 99203 OFFICE O/P NEW LOW 30 MIN: CPT | Performed by: ORTHOPAEDIC SURGERY

## 2018-11-06 NOTE — TELEPHONE ENCOUNTER
Auth: NPR  Date: 11/8/18  Reference # None  Type of SX: Outpatient  Location: [facility]   CPT [id number]   SX area: Rt distal radius

## 2018-11-06 NOTE — PROGRESS NOTES
Chief Complaint  Wrist Pain (Fell into pond while walking dog landing on right wrist 10/29/18)      HPI:  The patient is a 82 y.o. left-hand-dominant patient and is seen today regarding an right wrist injury occurring approximately 8 days ago. He shouldn't tripped falling into a pond walking her dog and had immediate pain followed by deformity and swelling of the right wrist.  she was seen for Emergency evaluation elsewhere where radiographs were obtained & she was immobilized. By report, there was not an associated skin injury. she reports moderate pain located in the dorsal area of the distal forearm & wrist, no tenderness throughout the hand or elbow. Pt does not have numbness in the hand. Medical History:  Past Medical History:   Diagnosis Date    Lower GI bleed 08/08/2017    [address]; biopsy nonspecific colitis    Subdural hematoma ([facility] [address] 12/14    ?syncopal spell     Past Surgical History:   Procedure Laterality Date    CARPAL TUNNEL RELEASE  1991    CATARACT REMOVAL Bilateral     COLONOSCOPY  2007    normal per pt    COLONOSCOPY  08/31/2017    Colitis    TONSILLECTOMY  1953     Family History   Problem Relation Age of Onset    High Blood Pressure Mother     Stroke Mother     Kidney Disease Father     High Blood Pressure Sister     Diabetes Brother     Heart Disease Brother      Social History     Social History    Marital status:       Spouse name: N/A    Number of children: N/A    Years of education: N/A     Social History Main Topics    Smoking status: Never Smoker    Smokeless tobacco: Never Used    Alcohol use No    Drug use: Unknown    Sexual activity: Not on file     Other Topics Concern    Not on file     Social History Narrative    No narrative on file     Current Outpatient Prescriptions   Medication Sig Dispense Refill    metFORMIN (GLUCOPHAGE) 1000 MG tablet TAKE ONE TABLET BY MOUTH TWICE A DAY WITH MEALS 180 tablet 1    lisinopril (PRINIVIL;ZESTRIL) 20 MG tablet

## 2018-11-07 ENCOUNTER — OFFICE VISIT (OUTPATIENT)
Dept: FAMILY MEDICINE CLINIC | Age: 83
End: 2018-11-07
Payer: MEDICARE

## 2018-11-07 ENCOUNTER — ANESTHESIA EVENT (OUTPATIENT)
Dept: OPERATING ROOM | Age: 83
End: 2018-11-07
Payer: MEDICARE

## 2018-11-07 VITALS
BODY MASS INDEX: 28.35 KG/M2 | DIASTOLIC BLOOD PRESSURE: 80 MMHG | OXYGEN SATURATION: 98 % | SYSTOLIC BLOOD PRESSURE: 146 MMHG | TEMPERATURE: 97.8 F | HEART RATE: 65 BPM | HEIGHT: 65 IN | WEIGHT: 170.13 LBS

## 2018-11-07 DIAGNOSIS — E11.9 TYPE 2 DIABETES MELLITUS WITHOUT COMPLICATION, WITHOUT LONG-TERM CURRENT USE OF INSULIN (HCC): ICD-10-CM

## 2018-11-07 DIAGNOSIS — Z01.818 PRE-OP EXAM: Primary | ICD-10-CM

## 2018-11-07 DIAGNOSIS — E55.9 VITAMIN D DEFICIENCY DISEASE: ICD-10-CM

## 2018-11-07 DIAGNOSIS — E78.00 PURE HYPERCHOLESTEROLEMIA: ICD-10-CM

## 2018-11-07 LAB
ANION GAP SERPL CALCULATED.3IONS-SCNC: 13 MMOL/L (ref 3–16)
BASOPHILS ABSOLUTE: 0 K/UL (ref 0–0.2)
BASOPHILS RELATIVE PERCENT: 0.8 %
BUN BLDV-MCNC: 17 MG/DL (ref 7–20)
CALCIUM SERPL-MCNC: 10.6 MG/DL (ref 8.3–10.6)
CHLORIDE BLD-SCNC: 98 MMOL/L (ref 99–110)
CHOLESTEROL, TOTAL: 149 MG/DL (ref 0–199)
CO2: 25 MMOL/L (ref 21–32)
CREAT SERPL-MCNC: 0.8 MG/DL (ref 0.6–1.2)
EOSINOPHILS ABSOLUTE: 0.1 K/UL (ref 0–0.6)
EOSINOPHILS RELATIVE PERCENT: 1.7 %
GFR AFRICAN AMERICAN: >60
GFR NON-AFRICAN AMERICAN: >60
GLUCOSE BLD-MCNC: 190 MG/DL (ref 70–99)
HBA1C MFR BLD: 8.2 %
HCT VFR BLD CALC: 38.4 % (ref 36–48)
HDLC SERPL-MCNC: 45 MG/DL (ref 40–60)
HEMOGLOBIN: 12.7 G/DL (ref 12–16)
LDL CHOLESTEROL CALCULATED: 75 MG/DL
LYMPHOCYTES ABSOLUTE: 0.8 K/UL (ref 1–5.1)
LYMPHOCYTES RELATIVE PERCENT: 12.6 %
MCH RBC QN AUTO: 30.6 PG (ref 26–34)
MCHC RBC AUTO-ENTMCNC: 33.1 G/DL (ref 31–36)
MCV RBC AUTO: 92.6 FL (ref 80–100)
MONOCYTES ABSOLUTE: 0.3 K/UL (ref 0–1.3)
MONOCYTES RELATIVE PERCENT: 5.5 %
NEUTROPHILS ABSOLUTE: 4.8 K/UL (ref 1.7–7.7)
NEUTROPHILS RELATIVE PERCENT: 79.4 %
PDW BLD-RTO: 14 % (ref 12.4–15.4)
PLATELET # BLD: 216 K/UL (ref 135–450)
PMV BLD AUTO: 11 FL (ref 5–10.5)
POTASSIUM SERPL-SCNC: 4.8 MMOL/L (ref 3.5–5.1)
RBC # BLD: 4.15 M/UL (ref 4–5.2)
SODIUM BLD-SCNC: 136 MMOL/L (ref 136–145)
TRIGL SERPL-MCNC: 144 MG/DL (ref 0–150)
VLDLC SERPL CALC-MCNC: 29 MG/DL
WBC # BLD: 6.1 K/UL (ref 4–11)

## 2018-11-07 PROCEDURE — 36415 COLL VENOUS BLD VENIPUNCTURE: CPT | Performed by: NURSE PRACTITIONER

## 2018-11-07 PROCEDURE — 93000 ELECTROCARDIOGRAM COMPLETE: CPT | Performed by: NURSE PRACTITIONER

## 2018-11-07 PROCEDURE — 83036 HEMOGLOBIN GLYCOSYLATED A1C: CPT | Performed by: NURSE PRACTITIONER

## 2018-11-07 PROCEDURE — 99215 OFFICE O/P EST HI 40 MIN: CPT | Performed by: NURSE PRACTITIONER

## 2018-11-07 RX ORDER — HYDROCODONE BITARTRATE AND ACETAMINOPHEN 5; 325 MG/1; MG/1
1 TABLET ORAL EVERY 6 HOURS PRN
Qty: 28 TABLET | Refills: 0 | Status: SHIPPED | OUTPATIENT
Start: 2018-11-07 | End: 2018-11-14

## 2018-11-07 ASSESSMENT — ENCOUNTER SYMPTOMS
SHORTNESS OF BREATH: 0
BACK PAIN: 0
BLOOD IN STOOL: 0
COUGH: 0
CHEST TIGHTNESS: 0
VOMITING: 0
SORE THROAT: 0
ABDOMINAL PAIN: 0
RHINORRHEA: 0
NAUSEA: 0

## 2018-11-07 NOTE — PROGRESS NOTES
 simvastatin (ZOCOR) 20 MG tablet TAKE ONE TABLET BY MOUTH ONCE NIGHTLY 90 tablet 1    atenolol (TENORMIN) 25 MG tablet TAKE ONE TABLET BY MOUTH DAILY 90 tablet 1    pioglitazone (ACTOS) 30 MG tablet TAKE ONE TABLET BY MOUTH DAILY 90 tablet 1    Melatonin 5 MG CAPS Take 5 mg by mouth nightly as needed (sleep) 1 capsule 0    glimepiride (AMARYL) 2 MG tablet TAKE ONE TABLET BY MOUTH TWICE A  tablet 1    Cholecalciferol (VITAMIN D3) 2000 units CAPS Take 1 capsule by mouth daily 1 capsule 0    glucose blood VI test strips (ONE TOUCH TEST STRIPS) strip Test BID Dx: E11.9  One touch ultra 100 each 3    Blood Glucose Monitoring Suppl (ONE TOUCH ULTRA 2) W/DEVICE KIT Dx: E11.9 1 kit 0    sitaGLIPtin (JANUVIA) 100 MG tablet Take 100 mg by mouth daily          Allergies   Allergen Reactions    Morphine Nausea And Vomiting        Family History   Problem Relation Age of Onset    High Blood Pressure Mother     Stroke Mother     Kidney Disease Father     High Blood Pressure Sister     Diabetes Brother     Heart Disease Brother         Social History   Substance Use Topics    Smoking status: Never Smoker    Smokeless tobacco: Never Used    Alcohol use No         Immunization History   Administered Date(s) Administered    DT 08/08/1975, 05/03/1990, 06/16/2005    Influenza Vaccine, unspecified formulation 11/14/2003, 10/25/2007, 10/30/2008, 10/26/2009, 11/08/2011    Influenza Virus Vaccine 12/11/2009, 10/19/2015, 10/12/2016    Influenza, Intradermal, Preservative free 11/01/2012, 10/24/2013, 10/31/2014    Influenza, [place], 3 Years and older, IM (Fluzone 3 yrs and older or Afluria 5 yrs and older) 10/09/2017    OPV 05/03/1990    Pneumococcal 13-valent Conjugate ([id number]) 12/02/2013    Pneumococcal Polysaccharide ([id number]) 06/05/2002, 12/31/2007    Tdap (Boostrix, Adacel) 10/15/2014       Review of Systems   Constitutional: Negative for appetite change, fatigue, fever and unexpected weight

## 2018-11-08 ENCOUNTER — ANESTHESIA (OUTPATIENT)
Dept: OPERATING ROOM | Age: 83
End: 2018-11-08
Payer: MEDICARE

## 2018-11-08 ENCOUNTER — HOSPITAL ENCOUNTER (OUTPATIENT)
Age: 83
Setting detail: OUTPATIENT SURGERY
Discharge: HOME | End: 2018-11-08
Attending: ORTHOPAEDIC SURGERY | Admitting: ORTHOPAEDIC SURGERY
Payer: MEDICARE

## 2018-11-08 VITALS
RESPIRATION RATE: 17 BRPM | WEIGHT: 160 LBS | SYSTOLIC BLOOD PRESSURE: 166 MMHG | HEIGHT: 65 IN | BODY MASS INDEX: 26.66 KG/M2 | HEART RATE: 85 BPM | TEMPERATURE: 98.5 F | OXYGEN SATURATION: 95 % | DIASTOLIC BLOOD PRESSURE: 74 MMHG

## 2018-11-08 VITALS
TEMPERATURE: 96.8 F | SYSTOLIC BLOOD PRESSURE: 128 MMHG | RESPIRATION RATE: 4 BRPM | OXYGEN SATURATION: 93 % | DIASTOLIC BLOOD PRESSURE: 59 MMHG

## 2018-11-08 LAB
GLUCOSE BLD-MCNC: 170 MG/DL (ref 70–99)
GLUCOSE BLD-MCNC: 214 MG/DL (ref 70–99)
PERFORMED ON: ABNORMAL
PERFORMED ON: ABNORMAL

## 2018-11-08 PROCEDURE — 7100000000 HC PACU RECOVERY - FIRST 15 MIN: Performed by: ORTHOPAEDIC SURGERY

## 2018-11-08 PROCEDURE — 7100000010 HC PHASE II RECOVERY - FIRST 15 MIN: Performed by: ORTHOPAEDIC SURGERY

## 2018-11-08 PROCEDURE — 7100000001 HC PACU RECOVERY - ADDTL 15 MIN: Performed by: ORTHOPAEDIC SURGERY

## 2018-11-08 PROCEDURE — 3700000001 HC ADD 15 MINUTES (ANESTHESIA): Performed by: ORTHOPAEDIC SURGERY

## 2018-11-08 PROCEDURE — 2580000003 HC RX 258: Performed by: ANESTHESIOLOGY

## 2018-11-08 PROCEDURE — 2720000010 HC SURG SUPPLY STERILE: Performed by: ORTHOPAEDIC SURGERY

## 2018-11-08 PROCEDURE — C1713 ANCHOR/SCREW BN/BN,TIS/BN: HCPCS | Performed by: ORTHOPAEDIC SURGERY

## 2018-11-08 PROCEDURE — 2500000003 HC RX 250 WO HCPCS: Performed by: ANESTHESIOLOGY

## 2018-11-08 PROCEDURE — 6360000002 HC RX W HCPCS: Performed by: ANESTHESIOLOGY

## 2018-11-08 PROCEDURE — 6360000002 HC RX W HCPCS: Performed by: NURSE ANESTHETIST, CERTIFIED REGISTERED

## 2018-11-08 PROCEDURE — 3700000000 HC ANESTHESIA ATTENDED CARE: Performed by: ORTHOPAEDIC SURGERY

## 2018-11-08 PROCEDURE — 2580000003 HC RX 258: Performed by: ORTHOPAEDIC SURGERY

## 2018-11-08 PROCEDURE — 64417 NJX AA&/STRD AX NERVE IMG: CPT | Performed by: ANESTHESIOLOGY

## 2018-11-08 PROCEDURE — 2709999900 HC NON-CHARGEABLE SUPPLY: Performed by: ORTHOPAEDIC SURGERY

## 2018-11-08 PROCEDURE — 2500000003 HC RX 250 WO HCPCS: Performed by: NURSE ANESTHETIST, CERTIFIED REGISTERED

## 2018-11-08 PROCEDURE — 7100000011 HC PHASE II RECOVERY - ADDTL 15 MIN: Performed by: ORTHOPAEDIC SURGERY

## 2018-11-08 PROCEDURE — 3600000014 HC SURGERY LEVEL 4 ADDTL 15MIN: Performed by: ORTHOPAEDIC SURGERY

## 2018-11-08 PROCEDURE — 3600000004 HC SURGERY LEVEL 4 BASE: Performed by: ORTHOPAEDIC SURGERY

## 2018-11-08 PROCEDURE — 6360000002 HC RX W HCPCS: Performed by: ORTHOPAEDIC SURGERY

## 2018-11-08 DEVICE — IMPLANTABLE DEVICE: Type: IMPLANTABLE DEVICE | Status: FUNCTIONAL

## 2018-11-08 DEVICE — SCREW BNE L18MM DIA2.7MM DST RAD LOK FULL THRD SQ DRV HD LO: Type: IMPLANTABLE DEVICE | Status: FUNCTIONAL

## 2018-11-08 DEVICE — SCREW BNE L15MM DIA2.7MM DST RAD NONLOCKING FULL THRD SQ: Type: IMPLANTABLE DEVICE | Status: FUNCTIONAL

## 2018-11-08 DEVICE — SCREW BNE L16MM DIA2.7MM DST RAD LOK FULL THRD SQ DRV HD LO: Type: IMPLANTABLE DEVICE | Status: FUNCTIONAL

## 2018-11-08 DEVICE — SCREW BNE L13MM DIA2.7MM DST RAD NONLOCKING FULL THRD SQ: Type: IMPLANTABLE DEVICE | Status: FUNCTIONAL

## 2018-11-08 DEVICE — PLATE BONE NONSTERILE RT VOLAR NAR CROSSLOCK DVR: Type: IMPLANTABLE DEVICE | Status: FUNCTIONAL

## 2018-11-08 DEVICE — SCREW BNE L18MM DIA2.7MM DST VOLAR RAD MULTDIR FULL THRD SQ: Type: IMPLANTABLE DEVICE | Status: FUNCTIONAL

## 2018-11-08 DEVICE — SCREW BNE L16MM DIA2.7MM CORT DST RAD LOK FULL THRD SQ DRV: Type: IMPLANTABLE DEVICE | Status: FUNCTIONAL

## 2018-11-08 DEVICE — SCREW BNE L14MM DIA2.7MM DST VOLAR RAD NONLOCKING FULL THRD: Type: IMPLANTABLE DEVICE | Status: FUNCTIONAL

## 2018-11-08 RX ORDER — LIDOCAINE HYDROCHLORIDE 20 MG/ML
INJECTION, SOLUTION EPIDURAL; INFILTRATION; INTRACAUDAL; PERINEURAL PRN
Status: DISCONTINUED | OUTPATIENT
Start: 2018-11-08 | End: 2018-11-08 | Stop reason: SDUPTHER

## 2018-11-08 RX ORDER — HYDROMORPHONE HCL 110MG/55ML
0.5 PATIENT CONTROLLED ANALGESIA SYRINGE INTRAVENOUS EVERY 5 MIN PRN
Status: DISCONTINUED | OUTPATIENT
Start: 2018-11-08 | End: 2018-11-08 | Stop reason: HOSPADM

## 2018-11-08 RX ORDER — ONDANSETRON 2 MG/ML
INJECTION INTRAMUSCULAR; INTRAVENOUS PRN
Status: DISCONTINUED | OUTPATIENT
Start: 2018-11-08 | End: 2018-11-08 | Stop reason: SDUPTHER

## 2018-11-08 RX ORDER — PROPOFOL 10 MG/ML
INJECTION, EMULSION INTRAVENOUS PRN
Status: DISCONTINUED | OUTPATIENT
Start: 2018-11-08 | End: 2018-11-08 | Stop reason: SDUPTHER

## 2018-11-08 RX ORDER — SODIUM CHLORIDE, SODIUM LACTATE, POTASSIUM CHLORIDE, CALCIUM CHLORIDE 600; 310; 30; 20 MG/100ML; MG/100ML; MG/100ML; MG/100ML
INJECTION, SOLUTION INTRAVENOUS CONTINUOUS
Status: DISCONTINUED | OUTPATIENT
Start: 2018-11-08 | End: 2018-11-08 | Stop reason: HOSPADM

## 2018-11-08 RX ORDER — DEXAMETHASONE SODIUM PHOSPHATE 4 MG/ML
INJECTION, SOLUTION INTRA-ARTICULAR; INTRALESIONAL; INTRAMUSCULAR; INTRAVENOUS; SOFT TISSUE PRN
Status: DISCONTINUED | OUTPATIENT
Start: 2018-11-08 | End: 2018-11-08 | Stop reason: SDUPTHER

## 2018-11-08 RX ORDER — FENTANYL CITRATE 50 UG/ML
25 INJECTION, SOLUTION INTRAMUSCULAR; INTRAVENOUS EVERY 5 MIN PRN
Status: DISCONTINUED | OUTPATIENT
Start: 2018-11-08 | End: 2018-11-08 | Stop reason: HOSPADM

## 2018-11-08 RX ORDER — HYDRALAZINE HYDROCHLORIDE 20 MG/ML
5 INJECTION INTRAMUSCULAR; INTRAVENOUS EVERY 10 MIN PRN
Status: DISCONTINUED | OUTPATIENT
Start: 2018-11-08 | End: 2018-11-08 | Stop reason: HOSPADM

## 2018-11-08 RX ORDER — LIDOCAINE HYDROCHLORIDE 10 MG/ML
1 INJECTION, SOLUTION EPIDURAL; INFILTRATION; INTRACAUDAL; PERINEURAL
Status: COMPLETED | OUTPATIENT
Start: 2018-11-08 | End: 2018-11-08

## 2018-11-08 RX ORDER — SODIUM CHLORIDE 0.9 % (FLUSH) 0.9 %
10 SYRINGE (ML) INJECTION EVERY 12 HOURS SCHEDULED
Status: DISCONTINUED | OUTPATIENT
Start: 2018-11-08 | End: 2018-11-08 | Stop reason: HOSPADM

## 2018-11-08 RX ORDER — ONDANSETRON 2 MG/ML
4 INJECTION INTRAMUSCULAR; INTRAVENOUS
Status: DISCONTINUED | OUTPATIENT
Start: 2018-11-08 | End: 2018-11-08 | Stop reason: HOSPADM

## 2018-11-08 RX ORDER — GLYCOPYRROLATE 0.2 MG/ML
INJECTION INTRAMUSCULAR; INTRAVENOUS PRN
Status: DISCONTINUED | OUTPATIENT
Start: 2018-11-08 | End: 2018-11-08 | Stop reason: SDUPTHER

## 2018-11-08 RX ORDER — FENTANYL CITRATE 50 UG/ML
INJECTION, SOLUTION INTRAMUSCULAR; INTRAVENOUS PRN
Status: DISCONTINUED | OUTPATIENT
Start: 2018-11-08 | End: 2018-11-08 | Stop reason: SDUPTHER

## 2018-11-08 RX ORDER — MEPERIDINE HYDROCHLORIDE 25 MG/ML
12.5 INJECTION INTRAMUSCULAR; INTRAVENOUS; SUBCUTANEOUS EVERY 5 MIN PRN
Status: DISCONTINUED | OUTPATIENT
Start: 2018-11-08 | End: 2018-11-08 | Stop reason: HOSPADM

## 2018-11-08 RX ORDER — BUPIVACAINE HYDROCHLORIDE 2.5 MG/ML
INJECTION, SOLUTION EPIDURAL; INFILTRATION; INTRACAUDAL PRN
Status: DISCONTINUED | OUTPATIENT
Start: 2018-11-08 | End: 2018-11-08 | Stop reason: SDUPTHER

## 2018-11-08 RX ORDER — HYDROMORPHONE HCL 110MG/55ML
0.25 PATIENT CONTROLLED ANALGESIA SYRINGE INTRAVENOUS EVERY 5 MIN PRN
Status: DISCONTINUED | OUTPATIENT
Start: 2018-11-08 | End: 2018-11-08 | Stop reason: HOSPADM

## 2018-11-08 RX ORDER — SODIUM CHLORIDE 0.9 % (FLUSH) 0.9 %
10 SYRINGE (ML) INJECTION PRN
Status: DISCONTINUED | OUTPATIENT
Start: 2018-11-08 | End: 2018-11-08 | Stop reason: HOSPADM

## 2018-11-08 RX ADMIN — FENTANYL CITRATE 25 MCG: 50 INJECTION INTRAMUSCULAR; INTRAVENOUS at 14:51

## 2018-11-08 RX ADMIN — FENTANYL CITRATE 25 MCG: 50 INJECTION INTRAMUSCULAR; INTRAVENOUS at 14:34

## 2018-11-08 RX ADMIN — SODIUM CHLORIDE, POTASSIUM CHLORIDE, SODIUM LACTATE AND CALCIUM CHLORIDE: 600; 310; 30; 20 INJECTION, SOLUTION INTRAVENOUS at 13:56

## 2018-11-08 RX ADMIN — SODIUM CHLORIDE, POTASSIUM CHLORIDE, SODIUM LACTATE AND CALCIUM CHLORIDE: 600; 310; 30; 20 INJECTION, SOLUTION INTRAVENOUS at 14:18

## 2018-11-08 RX ADMIN — PHENYLEPHRINE HYDROCHLORIDE 100 MCG: 10 INJECTION INTRAVENOUS at 14:46

## 2018-11-08 RX ADMIN — BUPIVACAINE HYDROCHLORIDE 30 ML: 2.5 INJECTION, SOLUTION EPIDURAL; INFILTRATION; INTRACAUDAL; PERINEURAL at 14:04

## 2018-11-08 RX ADMIN — LIDOCAINE HYDROCHLORIDE 1 ML: 10 INJECTION, SOLUTION EPIDURAL; INFILTRATION; INTRACAUDAL; PERINEURAL at 13:56

## 2018-11-08 RX ADMIN — PROPOFOL 50 MG: 10 INJECTION, EMULSION INTRAVENOUS at 14:22

## 2018-11-08 RX ADMIN — DEXAMETHASONE SODIUM PHOSPHATE 10 MG: 4 INJECTION, SOLUTION INTRAMUSCULAR; INTRAVENOUS at 14:31

## 2018-11-08 RX ADMIN — PHENYLEPHRINE HYDROCHLORIDE 50 MCG: 10 INJECTION INTRAVENOUS at 14:39

## 2018-11-08 RX ADMIN — FENTANYL CITRATE 25 MCG: 50 INJECTION INTRAMUSCULAR; INTRAVENOUS at 14:46

## 2018-11-08 RX ADMIN — GLYCOPYRROLATE 0.2 MG: 0.2 INJECTION, SOLUTION INTRAMUSCULAR; INTRAVENOUS at 14:36

## 2018-11-08 RX ADMIN — PHENYLEPHRINE HYDROCHLORIDE 50 MCG: 10 INJECTION INTRAVENOUS at 14:36

## 2018-11-08 RX ADMIN — FENTANYL CITRATE 25 MCG: 50 INJECTION INTRAMUSCULAR; INTRAVENOUS at 14:31

## 2018-11-08 RX ADMIN — SODIUM CHLORIDE, POTASSIUM CHLORIDE, SODIUM LACTATE AND CALCIUM CHLORIDE: 600; 310; 30; 20 INJECTION, SOLUTION INTRAVENOUS at 13:40

## 2018-11-08 RX ADMIN — PROPOFOL 50 MG: 10 INJECTION, EMULSION INTRAVENOUS at 14:23

## 2018-11-08 RX ADMIN — FENTANYL CITRATE 100 MCG: 50 INJECTION INTRAMUSCULAR; INTRAVENOUS at 14:02

## 2018-11-08 RX ADMIN — PROPOFOL 50 MG: 10 INJECTION, EMULSION INTRAVENOUS at 14:24

## 2018-11-08 RX ADMIN — LIDOCAINE HYDROCHLORIDE 60 MG: 20 INJECTION, SOLUTION EPIDURAL; INFILTRATION; INTRACAUDAL; PERINEURAL at 14:22

## 2018-11-08 RX ADMIN — VANCOMYCIN HYDROCHLORIDE 1000 MG: 1 INJECTION, POWDER, LYOPHILIZED, FOR SOLUTION INTRAVENOUS at 14:16

## 2018-11-08 RX ADMIN — ONDANSETRON 4 MG: 2 INJECTION, SOLUTION INTRAMUSCULAR; INTRAVENOUS at 14:31

## 2018-11-08 ASSESSMENT — PULMONARY FUNCTION TESTS
PIF_VALUE: 2
PIF_VALUE: 3
PIF_VALUE: 2
PIF_VALUE: 1
PIF_VALUE: 3
PIF_VALUE: 2
PIF_VALUE: 3
PIF_VALUE: 0
PIF_VALUE: 2
PIF_VALUE: 0
PIF_VALUE: 0
PIF_VALUE: 2
PIF_VALUE: 0
PIF_VALUE: 20
PIF_VALUE: 0
PIF_VALUE: 2
PIF_VALUE: 2
PIF_VALUE: 1
PIF_VALUE: 0
PIF_VALUE: 2
PIF_VALUE: 1
PIF_VALUE: 2
PIF_VALUE: 3
PIF_VALUE: 3
PIF_VALUE: 0
PIF_VALUE: 1
PIF_VALUE: 2
PIF_VALUE: 2
PIF_VALUE: 3
PIF_VALUE: 0
PIF_VALUE: 2
PIF_VALUE: 0
PIF_VALUE: 3
PIF_VALUE: 3
PIF_VALUE: 2
PIF_VALUE: 0
PIF_VALUE: 0
PIF_VALUE: 3
PIF_VALUE: 0
PIF_VALUE: 14
PIF_VALUE: 0
PIF_VALUE: 0
PIF_VALUE: 2
PIF_VALUE: 2
PIF_VALUE: 3
PIF_VALUE: 0
PIF_VALUE: 1
PIF_VALUE: 2
PIF_VALUE: 1
PIF_VALUE: 0
PIF_VALUE: 3
PIF_VALUE: 0
PIF_VALUE: 0
PIF_VALUE: 2
PIF_VALUE: 2
PIF_VALUE: 0
PIF_VALUE: 2
PIF_VALUE: 0

## 2018-11-08 ASSESSMENT — ACTIVITIES OF DAILY LIVING (ADL): EFFECT OF PAIN ON DAILY ACTIVITIES: MOVING ARM

## 2018-11-08 ASSESSMENT — PAIN SCALES - GENERAL: PAINLEVEL_OUTOF10: 0

## 2018-11-08 ASSESSMENT — PAIN DESCRIPTION - DESCRIPTORS: DESCRIPTORS: ACHING

## 2018-11-08 ASSESSMENT — PAIN - FUNCTIONAL ASSESSMENT: PAIN_FUNCTIONAL_ASSESSMENT: 0-10

## 2018-11-08 NOTE — H&P
I have reviewed the History & Physical and examined the patient and find no relevant changes. I have reviewed with the patient and/or family the risks, benefits, and alternatives to the procedure(s). All questions and concerns were addressed. Consent is on the chart. Surgical site, right wrist, has been marked by Dr Conor Holloway and confirmed by the patient. All questions and concerns were addressed today. Patient is in agreement with the plan.         Adriana Diaz MD  Hand & Upper Extremity Surgery  [address] [place] [address] partner of [facility] ([facility])

## 2018-11-09 LAB — VITAMIN D 1,25-DIHYDROXY: 21.4 PG/ML (ref 19.9–79.3)

## 2018-11-12 ENCOUNTER — OFFICE VISIT (OUTPATIENT)
Dept: FAMILY MEDICINE CLINIC | Age: 83
End: 2018-11-12
Payer: MEDICARE

## 2018-11-12 VITALS
BODY MASS INDEX: 28.19 KG/M2 | TEMPERATURE: 97 F | WEIGHT: 169.4 LBS | OXYGEN SATURATION: 97 % | HEART RATE: 74 BPM | SYSTOLIC BLOOD PRESSURE: 150 MMHG | DIASTOLIC BLOOD PRESSURE: 78 MMHG

## 2018-11-12 DIAGNOSIS — R93.89 ABNORMAL CXR: ICD-10-CM

## 2018-11-12 DIAGNOSIS — N95.1 POST MENOPAUSAL SYNDROME: ICD-10-CM

## 2018-11-12 DIAGNOSIS — E11.65 UNCONTROLLED TYPE 2 DIABETES MELLITUS WITH HYPERGLYCEMIA (HCC): Primary | ICD-10-CM

## 2018-11-12 DIAGNOSIS — I10 BENIGN ESSENTIAL HYPERTENSION: ICD-10-CM

## 2018-11-12 DIAGNOSIS — Z91.81 AT HIGH RISK FOR FALLS: ICD-10-CM

## 2018-11-12 PROCEDURE — 99214 OFFICE O/P EST MOD 30 MIN: CPT | Performed by: FAMILY MEDICINE

## 2018-11-12 ASSESSMENT — PATIENT HEALTH QUESTIONNAIRE - PHQ9
SUM OF ALL RESPONSES TO PHQ QUESTIONS 1-9: 0
SUM OF ALL RESPONSES TO PHQ QUESTIONS 1-9: 0
SUM OF ALL RESPONSES TO PHQ9 QUESTIONS 1 & 2: 0
1. LITTLE INTEREST OR PLEASURE IN DOING THINGS: 0
2. FEELING DOWN, DEPRESSED OR HOPELESS: 0

## 2018-11-12 NOTE — PROGRESS NOTES
Assessment and plan  1. Uncontrolled type 2 diabetes mellitus with hyperglycemia (HCC)  Resume pioglitazone routinely    2. Benign essential hypertension  Question control. Resume monitoring blood pressure at home, call if not below 150/85    3. Post menopausal syndrome   - status pending result of lab  - DEXA Bone Density 2 Sites; Future    4. Abnormal CXR   - status pending result of lab  - CT CHEST LOW DOSE; Future    5. At high risk for falls  No intervention indicated    Healthy Family prevention recommendations given. Continue all current prescription medications as listed below. RTC 3 months or sooner prn. Subjective  Patient returns for reevaluation of her medical problems including diabetes and hypertension. Her A1c recently was 8.2%. She admits she has not been taking her pioglitazone routinely. She denies any adverse reaction to it. She also has not been checking her blood pressure lately at home. Unfortunately she slipped while she was walking her dog. She was walking by a pond on wet grass. She fractured her right wrist.  She does not recall having a DEXA scan before she moved here from [place]. However she does recall that her mother had suffered a hip fracture in older age. They did a chest x-ray in the ER and some calcified plaques were noted. She reports exposure to asbestos when young.     Medications: see list below  Allergies   Allergen Reactions    Morphine Nausea And Vomiting     Past history:  As above    Review of systems:  Constitutional:  fatigue - no                                                  abnormal weight loss - no  Cardiac:  chest pain or discomfort - no                 lightheadedness - no  Respiratory: wheezing - no                       dyspnea on exertion - no            unusual cough - no  Gastrointestinal:  frequent heartburn- no                             dysphagia - no  Urologic:  Hematuria - no                   Dysuria - no    Objective  BP (!)

## 2018-11-13 ENCOUNTER — HOSPITAL ENCOUNTER (OUTPATIENT)
Dept: OCCUPATIONAL THERAPY | Age: 83
Setting detail: THERAPIES SERIES
Discharge: HOME | End: 2018-11-13
Payer: MEDICARE

## 2018-11-13 PROCEDURE — 97110 THERAPEUTIC EXERCISES: CPT | Performed by: OCCUPATIONAL THERAPIST

## 2018-11-13 PROCEDURE — G8984 CARRY CURRENT STATUS: HCPCS | Performed by: OCCUPATIONAL THERAPIST

## 2018-11-13 PROCEDURE — 97165 OT EVAL LOW COMPLEX 30 MIN: CPT | Performed by: OCCUPATIONAL THERAPIST

## 2018-11-13 PROCEDURE — 97112 NEUROMUSCULAR REEDUCATION: CPT | Performed by: OCCUPATIONAL THERAPIST

## 2018-11-13 PROCEDURE — G8985 CARRY GOAL STATUS: HCPCS | Performed by: OCCUPATIONAL THERAPIST

## 2018-11-13 PROCEDURE — L3906 WHO W/O JOINTS CF: HCPCS | Performed by: OCCUPATIONAL THERAPIST

## 2018-11-13 NOTE — FLOWSHEET NOTE
[facility], [address] [place] [address] [place]  Phone: [phone]  Fax [phone]  Hand Therapy Daily Treatment Note  Date:  2018    Patient: Roma Mays   : 1935   MRN: [id number]  Referring Physician: Referring Practitioner: Dr. Khoi Cardenas Diagnosis Information:   Diagnosis: R distal radius fracture with ORIF Treatment dx: B47.892         Date of injury: 10/29/18  Date of Surgery/Type of procedure: 18                                      [facility]  Comorbidities Affecting Functional Performance:     []Anxiety (F41.9)/Depression (F32.9)   []Diabetes Type 1(E10.65) or 2 (E11.65)   []Rheumatoid Arthritis (M05.9)  []Fibromyalgia (M79.7)  []Neuropathy(G60.9)  []Osteoarthritis(M19.91)  [x]None   []Other:    G-code: OT G-codes  Functional Assessment Tool Used: quick dash   Score: 48%  Functional Limitation: Carrying, moving and handling objects  Carrying, Moving and Handling Objects Current Status (): At least 40 percent but less than 60 percent impaired, limited or restricted  Carrying, Moving and Handling Objects Goal Status ():  At least 20 percent but less than 40 percent impaired, limited or restricted    Date of Patient follow up with Physician:  Preferred Language for Healthcare:   [x]English       []other:  Latex Allergy:  [x]NO      []YES  RESTRICTIONS/PRECAUTIONS:   Wrist cock up   Progress Note: [x]  Yes  []  No  Next due by : Visit #10       Visit # Insurance Allowable Requires auth   1     no:[]                  yes:[]    From 18 to 2018    Pain level:  110     SUBJECTIVE:  See eval     OBJECTIVE:   Date:  Hand Dominance:     []  Right    [x] Left 2018      Objective Measures:     PAIN /10   Quick DASH 48%                           Digits tip to DPFC in cm WNL's   Thumb ROM MP 30  IP 36   Thumb opposition  R:  L:   Thumb Radial/Palmar abd

## 2018-11-20 ENCOUNTER — OFFICE VISIT (OUTPATIENT)
Dept: ORTHOPEDIC SURGERY | Age: 83
End: 2018-11-20

## 2018-11-20 ENCOUNTER — HOSPITAL ENCOUNTER (OUTPATIENT)
Dept: OCCUPATIONAL THERAPY | Age: 83
Setting detail: THERAPIES SERIES
Discharge: HOME | End: 2018-11-20
Payer: MEDICARE

## 2018-11-20 VITALS — HEIGHT: 65 IN | BODY MASS INDEX: 28.21 KG/M2 | WEIGHT: 169.31 LBS

## 2018-11-20 DIAGNOSIS — S52.591D OTHER CLOSED FRACTURE OF DISTAL END OF RIGHT RADIUS WITH ROUTINE HEALING, SUBSEQUENT ENCOUNTER: ICD-10-CM

## 2018-11-20 DIAGNOSIS — M25.531 WRIST PAIN, RIGHT: Primary | ICD-10-CM

## 2018-11-20 PROCEDURE — 99024 POSTOP FOLLOW-UP VISIT: CPT | Performed by: ORTHOPAEDIC SURGERY

## 2018-11-20 PROCEDURE — 97110 THERAPEUTIC EXERCISES: CPT | Performed by: OCCUPATIONAL THERAPIST

## 2018-11-20 PROCEDURE — G8985 CARRY GOAL STATUS: HCPCS | Performed by: OCCUPATIONAL THERAPIST

## 2018-11-20 PROCEDURE — G8984 CARRY CURRENT STATUS: HCPCS | Performed by: OCCUPATIONAL THERAPIST

## 2018-11-20 PROCEDURE — 97140 MANUAL THERAPY 1/> REGIONS: CPT | Performed by: OCCUPATIONAL THERAPIST

## 2018-11-20 PROCEDURE — G8986 CARRY D/C STATUS: HCPCS | Performed by: OCCUPATIONAL THERAPIST

## 2018-11-20 NOTE — DISCHARGE SUMMARY
point R:  L:        MMT:                   Functional Limitation G-Code (if applicable):           CJ  Test/tests used to determine % limitation: quick dash  Actual Score used to drive % limitation: 14%    Treatment to date:  [x] Therapeutic Exercise    [x] Modalities:  [] Therapeutic Activity             [] Ultrasound  [] Electrical Stimulation  [] Gait Training     [] Cervical Traction [] Lumbar Traction  [x] Neuromuscular Re-education [] Cold/hotpack [] Iontophoresis  [x] Instruction in HEP      Other:  [x] Manual Therapy                   []                       ?           []   Assessment:  [] Most Goals were achieved. . MD said OK to do HEP   [] The following goals were achieved (#'s):  [] The following goals were not achieved for the following reasons:/assessmen of improvement as it relates to each goal:    Plan of Care:  [x] Discharge from Therapy Services due to:    Reason for Discharge:   [x] Most goals achieved    [] Patient having surgery  [] Physician discontinued therapy  [] Insurance/Financial Limitations [] Patient did not return for follow up visits [] Home program/1 visit only   [] No subjective or objective improvement [] Plateaued   [] Patient was unable to adhere to the plan of care established at evaluation. [] Referred back to physician for re-evaluation and did not return.      [] Other:?       Electronically signed by:  Natalya North OTR/L [id number]

## 2018-11-20 NOTE — PROGRESS NOTES
Chief Complaint   Patient presents with    Post-Op Check     Right wrist ORIF sx 11/8/2018       HISTORY OF PRESENT ILLNESS:  Roma Mays is a 82 y.o.  patient here for repeat x-ray evaluation after sustaining a right distal radius fracture, now 12 days following ORIF with volar plating. 1st postoperative visit today. No numbness reported in the fingers    ROS:  ROS neg     Past medical history is reviewed again today, no changes to report    PHYSICAL EXAMINATION:  Patient is alert and pleasant, in no acute distress. Here with her son. The affected extremity is examined today. Right wrist reveals a well-healing surgical site without evidence of infection or dehiscence. Mild swelling. Compartments soft. She has excellent full finger flexion and extension, no triggering. Fingers are sensate.  strength not tested with recent surgery. Wrist flexion and extension reveals approximately 25° in each direction, no crepitance or instability. He degrees of pronation, 60° of supination. X-rays:  3 views, right wrist, impression: Well-seated hardware, much improved alignment of the distal radius. IMPRESSION AND PLAN: Right distal radius fracture  Doing well after sustaining a right wrist injury and surgical stabilization within the past 2 weeks. X-rays reveal maintained alignment, patient is doing well clinically also. We will continue with our current care plan. Postoperative wound care was discussed with the patient today. Sutures and/or staples were removed without difficulty. Steri-Strips were applied (as long as no allergy) to help prevent wound dehiscence. Appropriate monitoring and care of the wound, including cleansing, was outlined with the patient today. We discussed appropriate activity limitations and modifications over the next couple weeks to prevent wound complication, such as dehiscence. The patient understands to contact my office if having wound problems.   These would include,

## 2018-11-20 NOTE — FLOWSHEET NOTE
[facility], [address] [place] [address]  Phone: [phone]  Fax [phone]  Hand Therapy Daily Treatment Note  Date:  2018    Patient: Damaris Le   : 1935   MRN: [id number]  Referring Physician: Referring Practitioner: Dr. Aleksandr Solano Diagnosis Information:   Diagnosis: R distal radius fracture with ORIF Treatment dx: W93.418         Date of injury: 10/29/18  Date of Surgery/Type of procedure: 18                                      [facility]  Comorbidities Affecting Functional Performance:     []Anxiety (F41.9)/Depression (F32.9)   []Diabetes Type 1(E10.65) or 2 (E11.65)   []Rheumatoid Arthritis (M05.9)  []Fibromyalgia (M79.7)  []Neuropathy(G60.9)  []Osteoarthritis(M19.91)  [x]None   []Other:    G-code: OT G-codes  Functional Assessment Tool Used: quick dash   Score: 48%  Functional Limitation: Carrying, moving and handling objects  Carrying, Moving and Handling Objects Current Status (): At least 40 percent but less than 60 percent impaired, limited or restricted  Carrying, Moving and Handling Objects Goal Status (): At least 20 percent but less than 40 percent impaired, limited or restricted    Date of Patient follow up with Physician:  Preferred Language for Healthcare:   [x]English       []other:  Latex Allergy:  [x]NO      []YES  RESTRICTIONS/PRECAUTIONS:   Wrist cock up   Progress Note: [x]  Yes  []  No  Next due by : Visit #10       Visit # Insurance Allowable Requires auth   2     no:[]                  yes:[]    From 18 to 2018    Pain level:  0/10     SUBJECTIVE:  The doctor said that I can do my therapy at home.      OBJECTIVE:   Date:  Hand Dominance:     []  Right    [x] Left 2018    Objective Measures:      PAIN 1/10 0/10   Quick DASH 48% 20%                               Digits tip to DPFC in cm WNL's    Thumb ROM flexibility, endurance, ROM of scapular, scapulothoracic and UE control with self care, reaching, carrying, lifting, house/yardwork, driving/computer work    Manual Treatments:   [] (37913) Provided manual therapy to mobilize soft tissue/joints of the UE for the purpose of modulating pain, promoting relaxation,  increasing ROM, reducing/eliminating soft tissue swelling/inflammation/restriction, improving soft tissue extensibility and allowing for proper ROM for normal function with self care, reaching, carrying, lifting, house/yardwork, driving/computer work    Splinting:  [x] Fabrication of: L-code   [] (83438) Checkout for orthotic/prosthetic use, established patient   [] (00933) Orthotic management and training (fitting and assessment)  [] Comments:    Charges:  Timed Code Treatment Minutes: 30   Total Treatment Minutes: 40   [] EVAL (LOW) 13607   [] OT Re-eval (67454)  [] EVAL (MOD) 81457   [] EVAL (HIGH) 96518       [x] Lachelle (22403) x      [] IRTYC(83593)  [] NMR (80497) x      [] Estim (attended) (82974)   [x] Manual ([id number]) x       [] US (52641)  [] TA (12525) x      [] Paraffin (23504)  [] ADL  (50898) x     [] Splint/L code: see above     [] Estim (unattended) (26489)  [] Other:  [] Fluidotherapy (52074)  [](36748) Checkout for orthotic use, established patient x       [] (70808) Orthotic mgmt & training  x        GOALS:  Short Term Goals: To be achieved in: 2 weeks  1. Independent in HEP and progression per patient tolerance, in order to prevent re-injury. 2. Patient will have a decrease in pain to facilitate improvement in movement, function, and ADLs as indicated by Functional Deficits.     Long Term Goals to be achieved in 6 weeks, including patient directed goals to address identified performance deficits:  1) Pt to be independent in graded HEP progression with a good level of effort and compliance.   2) Pt to report a score of  20% or less on the Quick DASH disability questionnaire for increased performance with carrying, moving, and handling objects. 3) Pt will demonstrate increased ROM to R wrist to 55/55 for improved ability to tamanna her bra   4) Pt will demonstrate increased strength to to R  to 75% of L for improved cooking cleaning tasks. 5) Pt will have a decrease in pain to 0/10 to facilitate improvement     Progression Towards Functional goals:  [] Patient is progressing as expected towards functional goals listed. [] Progression is slowed due to complexities listed. [] Progression has been slowed due to co-morbidities. [x] Plan just implemented, too soon to assess goals progression  [] Other:     ASSESSMENT:    Treatment/Activity Tolerance:  [x] Patient tolerated treatment well [] Patient limited by fatique  [] Patient limited by pain  [] Patient limited by other medical complications  [] Other:     Prognosis: [x] Good [] Fair  [] Poor    Patient Requires Follow-up: [x] Yes  [] No    PLAN: Recommend Occupational Therapy 1 times a week for 6 weeks  [] Continue per plan of care [] Alter current plan (see comments)  [] Plan of care initiated [] Hold pending MD visit [x] Discharge HEP only per MD    Plan for next session:     Thermal modalities, functional activities and strengthening as inicated, AROM, PROM as indicated    Electronically signed by: Liu Multani OTR/L [id number]

## 2018-12-03 ENCOUNTER — TELEPHONE (OUTPATIENT)
Dept: FAMILY MEDICINE CLINIC | Age: 83
End: 2018-12-03

## 2018-12-20 RX ORDER — GLIMEPIRIDE 2 MG/1
TABLET ORAL
Qty: 180 TABLET | Refills: 1 | Status: SHIPPED | OUTPATIENT
Start: 2018-12-20 | End: 2019-06-24 | Stop reason: SDUPTHER

## 2019-01-08 ENCOUNTER — HOSPITAL ENCOUNTER (OUTPATIENT)
Dept: GENERAL RADIOLOGY | Age: 84
Discharge: HOME | End: 2019-01-08
Payer: MEDICARE

## 2019-01-08 ENCOUNTER — OFFICE VISIT (OUTPATIENT)
Dept: ORTHOPEDIC SURGERY | Age: 84
End: 2019-01-08

## 2019-01-08 VITALS — HEIGHT: 65 IN | WEIGHT: 169.31 LBS | BODY MASS INDEX: 28.21 KG/M2

## 2019-01-08 DIAGNOSIS — M25.531 RIGHT WRIST PAIN: Primary | ICD-10-CM

## 2019-01-08 DIAGNOSIS — N95.1 POST MENOPAUSAL SYNDROME: ICD-10-CM

## 2019-01-08 DIAGNOSIS — S52.591D OTHER CLOSED FRACTURE OF DISTAL END OF RIGHT RADIUS WITH ROUTINE HEALING, SUBSEQUENT ENCOUNTER: ICD-10-CM

## 2019-01-08 PROCEDURE — 77080 DXA BONE DENSITY AXIAL: CPT

## 2019-01-08 PROCEDURE — 99024 POSTOP FOLLOW-UP VISIT: CPT | Performed by: ORTHOPAEDIC SURGERY

## 2019-01-10 PROBLEM — M85.89 OSTEOPENIA OF MULTIPLE SITES: Status: ACTIVE | Noted: 2019-01-10

## 2019-01-19 ENCOUNTER — APPOINTMENT (OUTPATIENT)
Dept: CT IMAGING | Age: 84
DRG: 871 | End: 2019-01-19
Payer: MEDICARE

## 2019-01-19 ENCOUNTER — APPOINTMENT (OUTPATIENT)
Dept: GENERAL RADIOLOGY | Age: 84
DRG: 871 | End: 2019-01-19
Payer: MEDICARE

## 2019-01-19 ENCOUNTER — HOSPITAL ENCOUNTER (INPATIENT)
Age: 84
LOS: 3 days | Discharge: HOME HEALTH | DRG: 871 | End: 2019-01-22
Attending: EMERGENCY MEDICINE | Admitting: INTERNAL MEDICINE
Payer: MEDICARE

## 2019-01-19 DIAGNOSIS — A41.9 SEPTICEMIA (HCC): ICD-10-CM

## 2019-01-19 DIAGNOSIS — N30.01 ACUTE CYSTITIS WITH HEMATURIA: ICD-10-CM

## 2019-01-19 DIAGNOSIS — R41.0 DISORIENTATION: Primary | ICD-10-CM

## 2019-01-19 PROBLEM — G93.40 ACUTE ENCEPHALOPATHY: Status: ACTIVE | Noted: 2019-01-19

## 2019-01-19 LAB
A/G RATIO: 1.1 (ref 1.1–2.2)
ALBUMIN SERPL-MCNC: 3.5 G/DL (ref 3.4–5)
ALP BLD-CCNC: 72 U/L (ref 40–129)
ALT SERPL-CCNC: 14 U/L (ref 10–40)
ANION GAP SERPL CALCULATED.3IONS-SCNC: 20 MMOL/L (ref 3–16)
AST SERPL-CCNC: 13 U/L (ref 15–37)
BACTERIA: ABNORMAL /HPF
BASOPHILS ABSOLUTE: 0 K/UL (ref 0–0.2)
BASOPHILS RELATIVE PERCENT: 0.1 %
BILIRUB SERPL-MCNC: 0.6 MG/DL (ref 0–1)
BILIRUBIN URINE: NEGATIVE
BLOOD, URINE: ABNORMAL
BUN BLDV-MCNC: 27 MG/DL (ref 7–20)
CALCIUM SERPL-MCNC: 9.5 MG/DL (ref 8.3–10.6)
CHLORIDE BLD-SCNC: 94 MMOL/L (ref 99–110)
CLARITY: ABNORMAL
CO2: 17 MMOL/L (ref 21–32)
COLOR: YELLOW
CREAT SERPL-MCNC: 1 MG/DL (ref 0.6–1.2)
EOSINOPHILS ABSOLUTE: 0 K/UL (ref 0–0.6)
EOSINOPHILS RELATIVE PERCENT: 0 %
EPITHELIAL CELLS, UA: ABNORMAL /HPF
GFR AFRICAN AMERICAN: >60
GFR NON-AFRICAN AMERICAN: 53
GLOBULIN: 3.3 G/DL
GLUCOSE BLD-MCNC: 263 MG/DL (ref 70–99)
GLUCOSE BLD-MCNC: 275 MG/DL (ref 70–99)
GLUCOSE URINE: 250 MG/DL
HCT VFR BLD CALC: 35.8 % (ref 36–48)
HEMOGLOBIN: 11.9 G/DL (ref 12–16)
KETONES, URINE: 40 MG/DL
LACTIC ACID: 2.1 MMOL/L (ref 0.4–2)
LACTIC ACID: 2.4 MMOL/L (ref 0.4–2)
LEUKOCYTE ESTERASE, URINE: ABNORMAL
LYMPHOCYTES ABSOLUTE: 0.2 K/UL (ref 1–5.1)
LYMPHOCYTES RELATIVE PERCENT: 10.3 %
MCH RBC QN AUTO: 30.5 PG (ref 26–34)
MCHC RBC AUTO-ENTMCNC: 33.2 G/DL (ref 31–36)
MCV RBC AUTO: 91.9 FL (ref 80–100)
MICROSCOPIC EXAMINATION: YES
MONOCYTES ABSOLUTE: 0 K/UL (ref 0–1.3)
MONOCYTES RELATIVE PERCENT: 0.6 %
NEUTROPHILS ABSOLUTE: 1.8 K/UL (ref 1.7–7.7)
NEUTROPHILS RELATIVE PERCENT: 89 %
NITRITE, URINE: POSITIVE
PDW BLD-RTO: 14 % (ref 12.4–15.4)
PERFORMED ON: ABNORMAL
PH UA: 5.5
PLATELET # BLD: 138 K/UL (ref 135–450)
PMV BLD AUTO: 9.2 FL (ref 5–10.5)
POTASSIUM SERPL-SCNC: 4 MMOL/L (ref 3.5–5.1)
PROTEIN UA: 30 MG/DL
RBC # BLD: 3.9 M/UL (ref 4–5.2)
RBC UA: ABNORMAL /HPF (ref 0–2)
SODIUM BLD-SCNC: 131 MMOL/L (ref 136–145)
SPECIFIC GRAVITY UA: <=1.005
TOTAL PROTEIN: 6.8 G/DL (ref 6.4–8.2)
TROPONIN: <0.01 NG/ML
URINE TYPE: ABNORMAL
UROBILINOGEN, URINE: 0.2 E.U./DL
WBC # BLD: 2 K/UL (ref 4–11)
WBC UA: ABNORMAL /HPF (ref 0–5)

## 2019-01-19 PROCEDURE — 80053 COMPREHEN METABOLIC PANEL: CPT

## 2019-01-19 PROCEDURE — 6370000000 HC RX 637 (ALT 250 FOR IP): Performed by: EMERGENCY MEDICINE

## 2019-01-19 PROCEDURE — 99285 EMERGENCY DEPT VISIT HI MDM: CPT

## 2019-01-19 PROCEDURE — 70450 CT HEAD/BRAIN W/O DYE: CPT

## 2019-01-19 PROCEDURE — 96367 TX/PROPH/DG ADDL SEQ IV INF: CPT

## 2019-01-19 PROCEDURE — 83605 ASSAY OF LACTIC ACID: CPT

## 2019-01-19 PROCEDURE — 96365 THER/PROPH/DIAG IV INF INIT: CPT

## 2019-01-19 PROCEDURE — 2580000003 HC RX 258: Performed by: EMERGENCY MEDICINE

## 2019-01-19 PROCEDURE — 6360000002 HC RX W HCPCS: Performed by: EMERGENCY MEDICINE

## 2019-01-19 PROCEDURE — 87040 BLOOD CULTURE FOR BACTERIA: CPT

## 2019-01-19 PROCEDURE — 36415 COLL VENOUS BLD VENIPUNCTURE: CPT

## 2019-01-19 PROCEDURE — 2060000000 HC ICU INTERMEDIATE R&B

## 2019-01-19 PROCEDURE — 2580000003 HC RX 258

## 2019-01-19 PROCEDURE — 84484 ASSAY OF TROPONIN QUANT: CPT

## 2019-01-19 PROCEDURE — 85025 COMPLETE CBC W/AUTO DIFF WBC: CPT

## 2019-01-19 PROCEDURE — 87186 SC STD MICRODIL/AGAR DIL: CPT

## 2019-01-19 PROCEDURE — 87801 DETECT AGNT MULT DNA AMPLI: CPT

## 2019-01-19 PROCEDURE — 93005 ELECTROCARDIOGRAM TRACING: CPT | Performed by: EMERGENCY MEDICINE

## 2019-01-19 PROCEDURE — 81001 URINALYSIS AUTO W/SCOPE: CPT

## 2019-01-19 PROCEDURE — 71045 X-RAY EXAM CHEST 1 VIEW: CPT

## 2019-01-19 RX ORDER — SODIUM CHLORIDE 9 MG/ML
INJECTION, SOLUTION INTRAVENOUS CONTINUOUS
Status: DISCONTINUED | OUTPATIENT
Start: 2019-01-19 | End: 2019-01-20

## 2019-01-19 RX ORDER — 0.9 % SODIUM CHLORIDE 0.9 %
1000 INTRAVENOUS SOLUTION INTRAVENOUS ONCE
Status: COMPLETED | OUTPATIENT
Start: 2019-01-19 | End: 2019-01-19

## 2019-01-19 RX ORDER — ACETAMINOPHEN 500 MG
1000 TABLET ORAL ONCE
Status: COMPLETED | OUTPATIENT
Start: 2019-01-19 | End: 2019-01-19

## 2019-01-19 RX ORDER — ACETAMINOPHEN 650 MG/1
1000 SUPPOSITORY RECTAL ONCE
Status: COMPLETED | OUTPATIENT
Start: 2019-01-19 | End: 2019-01-19

## 2019-01-19 RX ORDER — MEROPENEM 1 G/1
1 INJECTION, POWDER, FOR SOLUTION INTRAVENOUS ONCE
Status: COMPLETED | OUTPATIENT
Start: 2019-01-19 | End: 2019-01-19

## 2019-01-19 RX ORDER — SODIUM CHLORIDE 9 MG/ML
INJECTION, SOLUTION INTRAVENOUS
Status: COMPLETED
Start: 2019-01-19 | End: 2019-01-19

## 2019-01-19 RX ADMIN — SODIUM CHLORIDE: 9 INJECTION, SOLUTION INTRAVENOUS at 19:11

## 2019-01-19 RX ADMIN — SODIUM CHLORIDE: 900 INJECTION INTRAVENOUS at 19:11

## 2019-01-19 RX ADMIN — SODIUM CHLORIDE 1000 ML: 9 INJECTION, SOLUTION INTRAVENOUS at 21:38

## 2019-01-19 RX ADMIN — MEROPENEM 1 G: 1 INJECTION, POWDER, FOR SOLUTION INTRAVENOUS at 19:14

## 2019-01-19 RX ADMIN — ACETAMINOPHEN 1000 MG: 500 TABLET ORAL at 22:26

## 2019-01-19 RX ADMIN — SODIUM CHLORIDE 1000 ML: 9 INJECTION, SOLUTION INTRAVENOUS at 21:47

## 2019-01-19 RX ADMIN — SODIUM CHLORIDE: 900 INJECTION INTRAVENOUS at 19:14

## 2019-01-19 RX ADMIN — VANCOMYCIN HYDROCHLORIDE 1000 MG: 1 INJECTION, POWDER, LYOPHILIZED, FOR SOLUTION INTRAVENOUS at 19:51

## 2019-01-19 RX ADMIN — ACETAMINOPHEN 975 MG: 650 SUPPOSITORY RECTAL at 19:05

## 2019-01-19 ASSESSMENT — PAIN SCALES - GENERAL
PAINLEVEL_OUTOF10: 0
PAINLEVEL_OUTOF10: 0

## 2019-01-20 LAB
ANION GAP SERPL CALCULATED.3IONS-SCNC: 13 MMOL/L (ref 3–16)
BUN BLDV-MCNC: 28 MG/DL (ref 7–20)
CALCIUM SERPL-MCNC: 8.2 MG/DL (ref 8.3–10.6)
CHLORIDE BLD-SCNC: 99 MMOL/L (ref 99–110)
CO2: 17 MMOL/L (ref 21–32)
CREAT SERPL-MCNC: 1.5 MG/DL (ref 0.6–1.2)
EKG ATRIAL RATE: 115 BPM
EKG DIAGNOSIS: NORMAL
EKG P AXIS: 75 DEGREES
EKG P-R INTERVAL: 184 MS
EKG Q-T INTERVAL: 326 MS
EKG QRS DURATION: 90 MS
EKG QTC CALCULATION (BAZETT): 450 MS
EKG R AXIS: 47 DEGREES
EKG T AXIS: 72 DEGREES
EKG VENTRICULAR RATE: 115 BPM
GFR AFRICAN AMERICAN: 40
GFR NON-AFRICAN AMERICAN: 33
GLUCOSE BLD-MCNC: 237 MG/DL (ref 70–99)
GLUCOSE BLD-MCNC: 237 MG/DL (ref 70–99)
GLUCOSE BLD-MCNC: 256 MG/DL (ref 70–99)
GLUCOSE BLD-MCNC: 271 MG/DL (ref 70–99)
GLUCOSE BLD-MCNC: 317 MG/DL (ref 70–99)
GLUCOSE BLD-MCNC: 328 MG/DL (ref 70–99)
LACTIC ACID: 1.7 MMOL/L (ref 0.4–2)
LACTIC ACID: 2.1 MMOL/L (ref 0.4–2)
MAGNESIUM: 1.2 MG/DL (ref 1.8–2.4)
PERFORMED ON: ABNORMAL
POTASSIUM REFLEX MAGNESIUM: 3.3 MMOL/L (ref 3.5–5.1)
REPORT: NORMAL
SODIUM BLD-SCNC: 129 MMOL/L (ref 136–145)

## 2019-01-20 PROCEDURE — 2060000000 HC ICU INTERMEDIATE R&B

## 2019-01-20 PROCEDURE — 94664 DEMO&/EVAL PT USE INHALER: CPT

## 2019-01-20 PROCEDURE — 6370000000 HC RX 637 (ALT 250 FOR IP): Performed by: INTERNAL MEDICINE

## 2019-01-20 PROCEDURE — 83605 ASSAY OF LACTIC ACID: CPT

## 2019-01-20 PROCEDURE — 94761 N-INVAS EAR/PLS OXIMETRY MLT: CPT

## 2019-01-20 PROCEDURE — 93010 ELECTROCARDIOGRAM REPORT: CPT | Performed by: INTERNAL MEDICINE

## 2019-01-20 PROCEDURE — 6360000002 HC RX W HCPCS: Performed by: INTERNAL MEDICINE

## 2019-01-20 PROCEDURE — 80048 BASIC METABOLIC PNL TOTAL CA: CPT

## 2019-01-20 PROCEDURE — 36415 COLL VENOUS BLD VENIPUNCTURE: CPT

## 2019-01-20 PROCEDURE — 94640 AIRWAY INHALATION TREATMENT: CPT

## 2019-01-20 PROCEDURE — 94150 VITAL CAPACITY TEST: CPT

## 2019-01-20 PROCEDURE — 83735 ASSAY OF MAGNESIUM: CPT

## 2019-01-20 PROCEDURE — 2580000003 HC RX 258: Performed by: INTERNAL MEDICINE

## 2019-01-20 PROCEDURE — 83036 HEMOGLOBIN GLYCOSYLATED A1C: CPT

## 2019-01-20 RX ORDER — MAGNESIUM SULFATE 1 G/100ML
1 INJECTION INTRAVENOUS PRN
Status: DISCONTINUED | OUTPATIENT
Start: 2019-01-20 | End: 2019-01-22 | Stop reason: HOSPADM

## 2019-01-20 RX ORDER — NICOTINE POLACRILEX 4 MG
15 LOZENGE BUCCAL PRN
Status: DISCONTINUED | OUTPATIENT
Start: 2019-01-20 | End: 2019-01-22 | Stop reason: HOSPADM

## 2019-01-20 RX ORDER — DEXTROSE MONOHYDRATE 25 G/50ML
12.5 INJECTION, SOLUTION INTRAVENOUS PRN
Status: DISCONTINUED | OUTPATIENT
Start: 2019-01-20 | End: 2019-01-22 | Stop reason: HOSPADM

## 2019-01-20 RX ORDER — POTASSIUM CHLORIDE 750 MG/1
40 TABLET, EXTENDED RELEASE ORAL ONCE
Status: COMPLETED | OUTPATIENT
Start: 2019-01-20 | End: 2019-01-20

## 2019-01-20 RX ORDER — SODIUM CHLORIDE, SODIUM LACTATE, POTASSIUM CHLORIDE, CALCIUM CHLORIDE 600; 310; 30; 20 MG/100ML; MG/100ML; MG/100ML; MG/100ML
INJECTION, SOLUTION INTRAVENOUS CONTINUOUS
Status: DISCONTINUED | OUTPATIENT
Start: 2019-01-20 | End: 2019-01-20

## 2019-01-20 RX ORDER — DEXTROSE MONOHYDRATE 50 MG/ML
100 INJECTION, SOLUTION INTRAVENOUS PRN
Status: DISCONTINUED | OUTPATIENT
Start: 2019-01-20 | End: 2019-01-22 | Stop reason: HOSPADM

## 2019-01-20 RX ORDER — SODIUM CHLORIDE 0.9 % (FLUSH) 0.9 %
10 SYRINGE (ML) INJECTION EVERY 12 HOURS SCHEDULED
Status: DISCONTINUED | OUTPATIENT
Start: 2019-01-20 | End: 2019-01-22 | Stop reason: HOSPADM

## 2019-01-20 RX ORDER — IPRATROPIUM BROMIDE AND ALBUTEROL SULFATE 2.5; .5 MG/3ML; MG/3ML
1 SOLUTION RESPIRATORY (INHALATION) EVERY 4 HOURS PRN
Status: DISCONTINUED | OUTPATIENT
Start: 2019-01-20 | End: 2019-01-22 | Stop reason: HOSPADM

## 2019-01-20 RX ORDER — SODIUM CHLORIDE AND POTASSIUM CHLORIDE .9; .15 G/100ML; G/100ML
SOLUTION INTRAVENOUS CONTINUOUS
Status: DISCONTINUED | OUTPATIENT
Start: 2019-01-20 | End: 2019-01-21

## 2019-01-20 RX ORDER — GLIMEPIRIDE 2 MG/1
2 TABLET ORAL 2 TIMES DAILY
Status: DISCONTINUED | OUTPATIENT
Start: 2019-01-20 | End: 2019-01-22 | Stop reason: HOSPADM

## 2019-01-20 RX ORDER — SODIUM CHLORIDE 9 MG/ML
INJECTION, SOLUTION INTRAVENOUS
Status: DISPENSED
Start: 2019-01-20 | End: 2019-01-20

## 2019-01-20 RX ORDER — ONDANSETRON 2 MG/ML
4 INJECTION INTRAMUSCULAR; INTRAVENOUS EVERY 6 HOURS PRN
Status: DISCONTINUED | OUTPATIENT
Start: 2019-01-20 | End: 2019-01-22 | Stop reason: HOSPADM

## 2019-01-20 RX ORDER — SODIUM CHLORIDE 0.9 % (FLUSH) 0.9 %
10 SYRINGE (ML) INJECTION PRN
Status: DISCONTINUED | OUTPATIENT
Start: 2019-01-20 | End: 2019-01-22 | Stop reason: HOSPADM

## 2019-01-20 RX ORDER — ATENOLOL 25 MG/1
25 TABLET ORAL DAILY
Status: DISCONTINUED | OUTPATIENT
Start: 2019-01-20 | End: 2019-01-22 | Stop reason: HOSPADM

## 2019-01-20 RX ORDER — ACETAMINOPHEN 325 MG/1
650 TABLET ORAL EVERY 4 HOURS PRN
Status: DISCONTINUED | OUTPATIENT
Start: 2019-01-20 | End: 2019-01-22 | Stop reason: HOSPADM

## 2019-01-20 RX ADMIN — CEFTRIAXONE SODIUM 1 G: 1 INJECTION, POWDER, FOR SOLUTION INTRAMUSCULAR; INTRAVENOUS at 03:00

## 2019-01-20 RX ADMIN — ACETAMINOPHEN 650 MG: 325 TABLET ORAL at 15:38

## 2019-01-20 RX ADMIN — Medication 10 ML: at 09:01

## 2019-01-20 RX ADMIN — MAGNESIUM SULFATE HEPTAHYDRATE 1 G: 1 INJECTION, SOLUTION INTRAVENOUS at 05:41

## 2019-01-20 RX ADMIN — ATENOLOL 25 MG: 25 TABLET ORAL at 09:01

## 2019-01-20 RX ADMIN — GLIMEPIRIDE 2 MG: 2 TABLET ORAL at 09:01

## 2019-01-20 RX ADMIN — MAGNESIUM SULFATE HEPTAHYDRATE 1 G: 1 INJECTION, SOLUTION INTRAVENOUS at 06:48

## 2019-01-20 RX ADMIN — INSULIN LISPRO 4 UNITS: 100 INJECTION, SOLUTION INTRAVENOUS; SUBCUTANEOUS at 12:55

## 2019-01-20 RX ADMIN — POTASSIUM CHLORIDE AND SODIUM CHLORIDE: 900; 150 INJECTION, SOLUTION INTRAVENOUS at 21:53

## 2019-01-20 RX ADMIN — POTASSIUM CHLORIDE 40 MEQ: 10 TABLET, EXTENDED RELEASE ORAL at 12:54

## 2019-01-20 RX ADMIN — MAGNESIUM SULFATE HEPTAHYDRATE 1 G: 1 INJECTION, SOLUTION INTRAVENOUS at 10:56

## 2019-01-20 RX ADMIN — SODIUM CHLORIDE, POTASSIUM CHLORIDE, SODIUM LACTATE AND CALCIUM CHLORIDE: 600; 310; 30; 20 INJECTION, SOLUTION INTRAVENOUS at 03:00

## 2019-01-20 RX ADMIN — POTASSIUM CHLORIDE AND SODIUM CHLORIDE: 900; 150 INJECTION, SOLUTION INTRAVENOUS at 12:55

## 2019-01-20 RX ADMIN — INSULIN LISPRO 1 UNITS: 100 INJECTION, SOLUTION INTRAVENOUS; SUBCUTANEOUS at 21:51

## 2019-01-20 RX ADMIN — IPRATROPIUM BROMIDE AND ALBUTEROL SULFATE 1 AMPULE: .5; 3 SOLUTION RESPIRATORY (INHALATION) at 19:50

## 2019-01-20 RX ADMIN — INSULIN LISPRO 2 UNITS: 100 INJECTION, SOLUTION INTRAVENOUS; SUBCUTANEOUS at 17:09

## 2019-01-20 RX ADMIN — MAGNESIUM SULFATE HEPTAHYDRATE 1 G: 1 INJECTION, SOLUTION INTRAVENOUS at 09:30

## 2019-01-20 RX ADMIN — ENOXAPARIN SODIUM 40 MG: 40 INJECTION SUBCUTANEOUS at 09:01

## 2019-01-20 ASSESSMENT — PAIN SCALES - GENERAL
PAINLEVEL_OUTOF10: 0

## 2019-01-21 ENCOUNTER — APPOINTMENT (OUTPATIENT)
Dept: GENERAL RADIOLOGY | Age: 84
DRG: 871 | End: 2019-01-21
Payer: MEDICARE

## 2019-01-21 LAB
ANION GAP SERPL CALCULATED.3IONS-SCNC: 10 MMOL/L (ref 3–16)
BUN BLDV-MCNC: 27 MG/DL (ref 7–20)
CALCIUM SERPL-MCNC: 8.4 MG/DL (ref 8.3–10.6)
CHLORIDE BLD-SCNC: 103 MMOL/L (ref 99–110)
CO2: 19 MMOL/L (ref 21–32)
CREAT SERPL-MCNC: 1.4 MG/DL (ref 0.6–1.2)
ESTIMATED AVERAGE GLUCOSE: 191.5 MG/DL
GFR AFRICAN AMERICAN: 44
GFR NON-AFRICAN AMERICAN: 36
GLUCOSE BLD-MCNC: 208 MG/DL (ref 70–99)
GLUCOSE BLD-MCNC: 223 MG/DL (ref 70–99)
GLUCOSE BLD-MCNC: 228 MG/DL (ref 70–99)
GLUCOSE BLD-MCNC: 384 MG/DL (ref 70–99)
GLUCOSE BLD-MCNC: 420 MG/DL (ref 70–99)
HBA1C MFR BLD: 8.3 %
HCT VFR BLD CALC: 31.5 % (ref 36–48)
HEMOGLOBIN: 10.5 G/DL (ref 12–16)
MAGNESIUM: 2.6 MG/DL (ref 1.8–2.4)
MCH RBC QN AUTO: 30.7 PG (ref 26–34)
MCHC RBC AUTO-ENTMCNC: 33.3 G/DL (ref 31–36)
MCV RBC AUTO: 92.2 FL (ref 80–100)
PDW BLD-RTO: 14.5 % (ref 12.4–15.4)
PERFORMED ON: ABNORMAL
PHOSPHORUS: 2.5 MG/DL (ref 2.5–4.9)
PLATELET # BLD: 88 K/UL (ref 135–450)
PLATELET SLIDE REVIEW: ABNORMAL
PMV BLD AUTO: 10.3 FL (ref 5–10.5)
POTASSIUM SERPL-SCNC: 4.6 MMOL/L (ref 3.5–5.1)
RBC # BLD: 3.42 M/UL (ref 4–5.2)
SLIDE REVIEW: ABNORMAL
SODIUM BLD-SCNC: 132 MMOL/L (ref 136–145)
WBC # BLD: 16.1 K/UL (ref 4–11)

## 2019-01-21 PROCEDURE — 6360000002 HC RX W HCPCS: Performed by: INTERNAL MEDICINE

## 2019-01-21 PROCEDURE — 94664 DEMO&/EVAL PT USE INHALER: CPT

## 2019-01-21 PROCEDURE — 85027 COMPLETE CBC AUTOMATED: CPT

## 2019-01-21 PROCEDURE — 6370000000 HC RX 637 (ALT 250 FOR IP): Performed by: INTERNAL MEDICINE

## 2019-01-21 PROCEDURE — 94761 N-INVAS EAR/PLS OXIMETRY MLT: CPT

## 2019-01-21 PROCEDURE — 36415 COLL VENOUS BLD VENIPUNCTURE: CPT

## 2019-01-21 PROCEDURE — 74230 X-RAY XM SWLNG FUNCJ C+: CPT

## 2019-01-21 PROCEDURE — 80048 BASIC METABOLIC PNL TOTAL CA: CPT

## 2019-01-21 PROCEDURE — 92526 ORAL FUNCTION THERAPY: CPT

## 2019-01-21 PROCEDURE — 94640 AIRWAY INHALATION TREATMENT: CPT

## 2019-01-21 PROCEDURE — 6370000000 HC RX 637 (ALT 250 FOR IP): Performed by: PHYSICIAN ASSISTANT

## 2019-01-21 PROCEDURE — 2060000000 HC ICU INTERMEDIATE R&B

## 2019-01-21 PROCEDURE — 84100 ASSAY OF PHOSPHORUS: CPT

## 2019-01-21 PROCEDURE — 92610 EVALUATE SWALLOWING FUNCTION: CPT

## 2019-01-21 PROCEDURE — 83735 ASSAY OF MAGNESIUM: CPT

## 2019-01-21 PROCEDURE — 92611 MOTION FLUOROSCOPY/SWALLOW: CPT

## 2019-01-21 PROCEDURE — 2580000003 HC RX 258: Performed by: INTERNAL MEDICINE

## 2019-01-21 PROCEDURE — 99232 SBSQ HOSP IP/OBS MODERATE 35: CPT | Performed by: PHYSICIAN ASSISTANT

## 2019-01-21 RX ORDER — IPRATROPIUM BROMIDE AND ALBUTEROL SULFATE 2.5; .5 MG/3ML; MG/3ML
1 SOLUTION RESPIRATORY (INHALATION)
Status: DISCONTINUED | OUTPATIENT
Start: 2019-01-21 | End: 2019-01-22 | Stop reason: HOSPADM

## 2019-01-21 RX ADMIN — IPRATROPIUM BROMIDE AND ALBUTEROL SULFATE 1 AMPULE: .5; 3 SOLUTION RESPIRATORY (INHALATION) at 03:45

## 2019-01-21 RX ADMIN — ATENOLOL 25 MG: 25 TABLET ORAL at 18:17

## 2019-01-21 RX ADMIN — IPRATROPIUM BROMIDE AND ALBUTEROL SULFATE 1 AMPULE: .5; 3 SOLUTION RESPIRATORY (INHALATION) at 23:26

## 2019-01-21 RX ADMIN — POTASSIUM CHLORIDE AND SODIUM CHLORIDE: 900; 150 INJECTION, SOLUTION INTRAVENOUS at 10:00

## 2019-01-21 RX ADMIN — INSULIN LISPRO 5 UNITS: 100 INJECTION, SOLUTION INTRAVENOUS; SUBCUTANEOUS at 16:58

## 2019-01-21 RX ADMIN — ACETAMINOPHEN 650 MG: 325 TABLET ORAL at 15:44

## 2019-01-21 RX ADMIN — GLIMEPIRIDE 2 MG: 2 TABLET ORAL at 21:31

## 2019-01-21 RX ADMIN — IPRATROPIUM BROMIDE AND ALBUTEROL SULFATE 1 AMPULE: .5; 3 SOLUTION RESPIRATORY (INHALATION) at 19:44

## 2019-01-21 RX ADMIN — IPRATROPIUM BROMIDE AND ALBUTEROL SULFATE 1 AMPULE: .5; 3 SOLUTION RESPIRATORY (INHALATION) at 14:39

## 2019-01-21 RX ADMIN — Medication 10 ML: at 21:31

## 2019-01-21 RX ADMIN — INSULIN LISPRO 3 UNITS: 100 INJECTION, SOLUTION INTRAVENOUS; SUBCUTANEOUS at 21:35

## 2019-01-21 RX ADMIN — CEFTRIAXONE SODIUM 1 G: 1 INJECTION, POWDER, FOR SOLUTION INTRAMUSCULAR; INTRAVENOUS at 03:19

## 2019-01-21 ASSESSMENT — PAIN DESCRIPTION - PAIN TYPE: TYPE: ACUTE PAIN

## 2019-01-21 ASSESSMENT — PAIN DESCRIPTION - LOCATION: LOCATION: LEG

## 2019-01-21 ASSESSMENT — PAIN SCALES - GENERAL
PAINLEVEL_OUTOF10: 0
PAINLEVEL_OUTOF10: 4

## 2019-01-21 ASSESSMENT — PAIN DESCRIPTION - ONSET: ONSET: ON-GOING

## 2019-01-21 ASSESSMENT — PAIN DESCRIPTION - PROGRESSION: CLINICAL_PROGRESSION: GRADUALLY WORSENING

## 2019-01-21 ASSESSMENT — PAIN DESCRIPTION - FREQUENCY: FREQUENCY: CONTINUOUS

## 2019-01-21 ASSESSMENT — PAIN DESCRIPTION - DESCRIPTORS: DESCRIPTORS: ACHING;DISCOMFORT

## 2019-01-21 ASSESSMENT — PAIN DESCRIPTION - ORIENTATION: ORIENTATION: RIGHT;LEFT

## 2019-01-21 ASSESSMENT — PAIN - FUNCTIONAL ASSESSMENT: PAIN_FUNCTIONAL_ASSESSMENT: PREVENTS OR INTERFERES SOME ACTIVE ACTIVITIES AND ADLS

## 2019-01-22 VITALS
HEIGHT: 65 IN | HEART RATE: 66 BPM | WEIGHT: 176.8 LBS | DIASTOLIC BLOOD PRESSURE: 80 MMHG | RESPIRATION RATE: 18 BRPM | OXYGEN SATURATION: 95 % | BODY MASS INDEX: 29.46 KG/M2 | SYSTOLIC BLOOD PRESSURE: 167 MMHG | TEMPERATURE: 97.9 F

## 2019-01-22 PROBLEM — G93.40 ACUTE ENCEPHALOPATHY: Status: RESOLVED | Noted: 2019-01-19 | Resolved: 2019-01-22

## 2019-01-22 LAB
ANION GAP SERPL CALCULATED.3IONS-SCNC: 10 MMOL/L (ref 3–16)
BLOOD CULTURE, ROUTINE: ABNORMAL
BUN BLDV-MCNC: 18 MG/DL (ref 7–20)
CALCIUM SERPL-MCNC: 8.7 MG/DL (ref 8.3–10.6)
CHLORIDE BLD-SCNC: 107 MMOL/L (ref 99–110)
CO2: 20 MMOL/L (ref 21–32)
CREAT SERPL-MCNC: 1.1 MG/DL (ref 0.6–1.2)
CULTURE, BLOOD 2: ABNORMAL
CULTURE, BLOOD 2: ABNORMAL
GFR AFRICAN AMERICAN: 58
GFR NON-AFRICAN AMERICAN: 48
GLUCOSE BLD-MCNC: 272 MG/DL (ref 70–99)
GLUCOSE BLD-MCNC: 289 MG/DL (ref 70–99)
GLUCOSE BLD-MCNC: 292 MG/DL (ref 70–99)
GLUCOSE BLD-MCNC: 295 MG/DL (ref 70–99)
HCT VFR BLD CALC: 29.8 % (ref 36–48)
HEMOGLOBIN: 9.8 G/DL (ref 12–16)
MAGNESIUM: 2.3 MG/DL (ref 1.8–2.4)
MCH RBC QN AUTO: 30.1 PG (ref 26–34)
MCHC RBC AUTO-ENTMCNC: 32.9 G/DL (ref 31–36)
MCV RBC AUTO: 91.4 FL (ref 80–100)
ORGANISM: ABNORMAL
PDW BLD-RTO: 14.5 % (ref 12.4–15.4)
PERFORMED ON: ABNORMAL
PHOSPHORUS: 2.4 MG/DL (ref 2.5–4.9)
PLATELET # BLD: 106 K/UL (ref 135–450)
PMV BLD AUTO: 10.6 FL (ref 5–10.5)
POTASSIUM SERPL-SCNC: 4.4 MMOL/L (ref 3.5–5.1)
RBC # BLD: 3.26 M/UL (ref 4–5.2)
SODIUM BLD-SCNC: 137 MMOL/L (ref 136–145)
WBC # BLD: 15.2 K/UL (ref 4–11)

## 2019-01-22 PROCEDURE — 92526 ORAL FUNCTION THERAPY: CPT

## 2019-01-22 PROCEDURE — 97535 SELF CARE MNGMENT TRAINING: CPT

## 2019-01-22 PROCEDURE — 2500000003 HC RX 250 WO HCPCS: Performed by: INTERNAL MEDICINE

## 2019-01-22 PROCEDURE — 84100 ASSAY OF PHOSPHORUS: CPT

## 2019-01-22 PROCEDURE — 97116 GAIT TRAINING THERAPY: CPT

## 2019-01-22 PROCEDURE — 6370000000 HC RX 637 (ALT 250 FOR IP): Performed by: INTERNAL MEDICINE

## 2019-01-22 PROCEDURE — 94640 AIRWAY INHALATION TREATMENT: CPT

## 2019-01-22 PROCEDURE — 94761 N-INVAS EAR/PLS OXIMETRY MLT: CPT

## 2019-01-22 PROCEDURE — 6370000000 HC RX 637 (ALT 250 FOR IP): Performed by: PHYSICIAN ASSISTANT

## 2019-01-22 PROCEDURE — 97166 OT EVAL MOD COMPLEX 45 MIN: CPT

## 2019-01-22 PROCEDURE — 85027 COMPLETE CBC AUTOMATED: CPT

## 2019-01-22 PROCEDURE — 2580000003 HC RX 258: Performed by: INTERNAL MEDICINE

## 2019-01-22 PROCEDURE — 6360000002 HC RX W HCPCS: Performed by: INTERNAL MEDICINE

## 2019-01-22 PROCEDURE — 83735 ASSAY OF MAGNESIUM: CPT

## 2019-01-22 PROCEDURE — 99238 HOSP IP/OBS DSCHRG MGMT 30/<: CPT | Performed by: INTERNAL MEDICINE

## 2019-01-22 PROCEDURE — 36415 COLL VENOUS BLD VENIPUNCTURE: CPT

## 2019-01-22 PROCEDURE — 97530 THERAPEUTIC ACTIVITIES: CPT

## 2019-01-22 PROCEDURE — 97162 PT EVAL MOD COMPLEX 30 MIN: CPT

## 2019-01-22 PROCEDURE — 80048 BASIC METABOLIC PNL TOTAL CA: CPT

## 2019-01-22 RX ORDER — ALBUTEROL SULFATE 90 UG/1
2 AEROSOL, METERED RESPIRATORY (INHALATION) 4 TIMES DAILY PRN
Qty: 1 INHALER | Refills: 1 | Status: SHIPPED | OUTPATIENT
Start: 2019-01-22 | End: 2020-12-05

## 2019-01-22 RX ORDER — CIPROFLOXACIN 250 MG/1
250 TABLET, FILM COATED ORAL 2 TIMES DAILY
Qty: 14 TABLET | Refills: 0 | Status: SHIPPED | OUTPATIENT
Start: 2019-01-22 | End: 2019-01-29

## 2019-01-22 RX ORDER — LISINOPRIL 20 MG/1
20 TABLET ORAL DAILY
Status: DISCONTINUED | OUTPATIENT
Start: 2019-01-22 | End: 2019-01-22 | Stop reason: HOSPADM

## 2019-01-22 RX ORDER — LABETALOL HYDROCHLORIDE 5 MG/ML
10 INJECTION, SOLUTION INTRAVENOUS EVERY 6 HOURS PRN
Status: DISCONTINUED | OUTPATIENT
Start: 2019-01-22 | End: 2019-01-22 | Stop reason: HOSPADM

## 2019-01-22 RX ADMIN — CEFTRIAXONE SODIUM 1 G: 1 INJECTION, POWDER, FOR SOLUTION INTRAMUSCULAR; INTRAVENOUS at 02:26

## 2019-01-22 RX ADMIN — ENOXAPARIN SODIUM 40 MG: 40 INJECTION SUBCUTANEOUS at 08:10

## 2019-01-22 RX ADMIN — LISINOPRIL 20 MG: 20 TABLET ORAL at 14:42

## 2019-01-22 RX ADMIN — LABETALOL HYDROCHLORIDE 10 MG: 5 INJECTION, SOLUTION INTRAVENOUS at 02:15

## 2019-01-22 RX ADMIN — Medication 10 ML: at 02:15

## 2019-01-22 RX ADMIN — INSULIN LISPRO 3 UNITS: 100 INJECTION, SOLUTION INTRAVENOUS; SUBCUTANEOUS at 11:48

## 2019-01-22 RX ADMIN — IPRATROPIUM BROMIDE AND ALBUTEROL SULFATE 1 AMPULE: .5; 3 SOLUTION RESPIRATORY (INHALATION) at 11:26

## 2019-01-22 RX ADMIN — ATENOLOL 25 MG: 25 TABLET ORAL at 08:10

## 2019-01-22 RX ADMIN — Medication 10 ML: at 08:11

## 2019-01-22 RX ADMIN — GLIMEPIRIDE 2 MG: 2 TABLET ORAL at 08:10

## 2019-01-22 RX ADMIN — INSULIN LISPRO 3 UNITS: 100 INJECTION, SOLUTION INTRAVENOUS; SUBCUTANEOUS at 08:11

## 2019-01-22 RX ADMIN — IPRATROPIUM BROMIDE AND ALBUTEROL SULFATE 1 AMPULE: .5; 3 SOLUTION RESPIRATORY (INHALATION) at 07:16

## 2019-01-22 RX ADMIN — IPRATROPIUM BROMIDE AND ALBUTEROL SULFATE 1 AMPULE: .5; 3 SOLUTION RESPIRATORY (INHALATION) at 15:19

## 2019-01-23 ENCOUNTER — TELEPHONE (OUTPATIENT)
Dept: FAMILY MEDICINE CLINIC | Age: 84
End: 2019-01-23

## 2019-01-23 ENCOUNTER — TELEPHONE (OUTPATIENT)
Dept: PHARMACY | Facility: CLINIC | Age: 84
End: 2019-01-23

## 2019-01-23 ENCOUNTER — CARE COORDINATION (OUTPATIENT)
Dept: CASE MANAGEMENT | Age: 84
End: 2019-01-23

## 2019-01-23 DIAGNOSIS — G93.40 ACUTE ENCEPHALOPATHY: Primary | ICD-10-CM

## 2019-01-23 PROCEDURE — 1111F DSCHRG MED/CURRENT MED MERGE: CPT

## 2019-01-24 ENCOUNTER — TELEPHONE (OUTPATIENT)
Dept: FAMILY MEDICINE CLINIC | Age: 84
End: 2019-01-24

## 2019-01-25 ENCOUNTER — CARE COORDINATION (OUTPATIENT)
Dept: CASE MANAGEMENT | Age: 84
End: 2019-01-25

## 2019-01-28 ENCOUNTER — OFFICE VISIT (OUTPATIENT)
Dept: FAMILY MEDICINE CLINIC | Age: 84
End: 2019-01-28
Payer: MEDICARE

## 2019-01-28 VITALS
WEIGHT: 168 LBS | OXYGEN SATURATION: 96 % | HEART RATE: 77 BPM | SYSTOLIC BLOOD PRESSURE: 150 MMHG | DIASTOLIC BLOOD PRESSURE: 74 MMHG | BODY MASS INDEX: 27.96 KG/M2

## 2019-01-28 DIAGNOSIS — E11.65 UNCONTROLLED TYPE 2 DIABETES MELLITUS WITH HYPERGLYCEMIA (HCC): ICD-10-CM

## 2019-01-28 DIAGNOSIS — R06.09 DYSPNEA ON EXERTION: ICD-10-CM

## 2019-01-28 DIAGNOSIS — I10 BENIGN ESSENTIAL HYPERTENSION: ICD-10-CM

## 2019-01-28 DIAGNOSIS — G93.49 ENCEPHALOPATHY DUE TO INFECTION: ICD-10-CM

## 2019-01-28 DIAGNOSIS — A41.51 SEPTICEMIA DUE TO E. COLI (HCC): ICD-10-CM

## 2019-01-28 DIAGNOSIS — N30.00 ACUTE CYSTITIS WITHOUT HEMATURIA: Primary | ICD-10-CM

## 2019-01-28 DIAGNOSIS — B99.9 ENCEPHALOPATHY DUE TO INFECTION: ICD-10-CM

## 2019-01-28 PROBLEM — R93.89 ABNORMAL CXR (CHEST X-RAY): Status: ACTIVE | Noted: 2019-01-28

## 2019-01-28 PROCEDURE — 99496 TRANSJ CARE MGMT HIGH F2F 7D: CPT | Performed by: FAMILY MEDICINE

## 2019-01-28 RX ORDER — AZITHROMYCIN 250 MG/1
TABLET, FILM COATED ORAL
Qty: 6 TABLET | Refills: 0 | Status: SHIPPED | OUTPATIENT
Start: 2019-01-28 | End: 2019-02-07

## 2019-01-31 ENCOUNTER — CARE COORDINATION (OUTPATIENT)
Dept: CASE MANAGEMENT | Age: 84
End: 2019-01-31

## 2019-02-15 ENCOUNTER — TELEPHONE (OUTPATIENT)
Dept: FAMILY MEDICINE CLINIC | Age: 84
End: 2019-02-15

## 2019-02-20 RX ORDER — LISINOPRIL 20 MG/1
TABLET ORAL
Qty: 90 TABLET | Refills: 1 | Status: SHIPPED | OUTPATIENT
Start: 2019-02-20 | End: 2019-08-18 | Stop reason: SDUPTHER

## 2019-03-18 ENCOUNTER — TELEPHONE (OUTPATIENT)
Dept: FAMILY MEDICINE CLINIC | Age: 84
End: 2019-03-18

## 2019-03-18 RX ORDER — OSELTAMIVIR PHOSPHATE 75 MG/1
75 CAPSULE ORAL DAILY
Qty: 10 CAPSULE | Refills: 0 | Status: SHIPPED | OUTPATIENT
Start: 2019-03-18 | End: 2019-03-28

## 2019-03-21 ENCOUNTER — OFFICE VISIT (OUTPATIENT)
Dept: FAMILY MEDICINE CLINIC | Age: 84
End: 2019-03-21
Payer: MEDICARE

## 2019-03-21 VITALS
SYSTOLIC BLOOD PRESSURE: 138 MMHG | WEIGHT: 164 LBS | HEART RATE: 74 BPM | DIASTOLIC BLOOD PRESSURE: 76 MMHG | BODY MASS INDEX: 27.29 KG/M2 | OXYGEN SATURATION: 97 % | TEMPERATURE: 97.6 F

## 2019-03-21 DIAGNOSIS — R55 SYNCOPE, UNSPECIFIED SYNCOPE TYPE: Primary | ICD-10-CM

## 2019-03-21 DIAGNOSIS — R53.83 OTHER FATIGUE: ICD-10-CM

## 2019-03-21 DIAGNOSIS — Z87.440 RECENT URINARY TRACT INFECTION: ICD-10-CM

## 2019-03-21 LAB
BASOPHILS ABSOLUTE: 0 K/UL (ref 0–0.2)
BASOPHILS RELATIVE PERCENT: 0.5 %
EOSINOPHILS ABSOLUTE: 0 K/UL (ref 0–0.6)
EOSINOPHILS RELATIVE PERCENT: 0.8 %
HCT VFR BLD CALC: 36.4 % (ref 36–48)
HEMOGLOBIN: 11.7 G/DL (ref 12–16)
LYMPHOCYTES ABSOLUTE: 1.3 K/UL (ref 1–5.1)
LYMPHOCYTES RELATIVE PERCENT: 20.8 %
MCH RBC QN AUTO: 30.2 PG (ref 26–34)
MCHC RBC AUTO-ENTMCNC: 32.2 G/DL (ref 31–36)
MCV RBC AUTO: 93.9 FL (ref 80–100)
MONOCYTES ABSOLUTE: 0.3 K/UL (ref 0–1.3)
MONOCYTES RELATIVE PERCENT: 5 %
NEUTROPHILS ABSOLUTE: 4.4 K/UL (ref 1.7–7.7)
NEUTROPHILS RELATIVE PERCENT: 72.9 %
PDW BLD-RTO: 14.6 % (ref 12.4–15.4)
PLATELET # BLD: 226 K/UL (ref 135–450)
PMV BLD AUTO: 10.4 FL (ref 5–10.5)
RBC # BLD: 3.88 M/UL (ref 4–5.2)
WBC # BLD: 6.1 K/UL (ref 4–11)

## 2019-03-21 PROCEDURE — 36415 COLL VENOUS BLD VENIPUNCTURE: CPT | Performed by: NURSE PRACTITIONER

## 2019-03-21 PROCEDURE — 99214 OFFICE O/P EST MOD 30 MIN: CPT | Performed by: NURSE PRACTITIONER

## 2019-03-21 PROCEDURE — G8510 SCR DEP NEG, NO PLAN REQD: HCPCS | Performed by: NURSE PRACTITIONER

## 2019-03-21 ASSESSMENT — PATIENT HEALTH QUESTIONNAIRE - PHQ9
SUM OF ALL RESPONSES TO PHQ9 QUESTIONS 1 & 2: 0
2. FEELING DOWN, DEPRESSED OR HOPELESS: 0
SUM OF ALL RESPONSES TO PHQ QUESTIONS 1-9: 0
SUM OF ALL RESPONSES TO PHQ QUESTIONS 1-9: 0
1. LITTLE INTEREST OR PLEASURE IN DOING THINGS: 0

## 2019-03-22 PROBLEM — E11.65 UNCONTROLLED TYPE 2 DIABETES MELLITUS WITH HYPERGLYCEMIA (HCC): Status: RESOLVED | Noted: 2019-01-28 | Resolved: 2019-03-22

## 2019-03-22 PROBLEM — A41.51 SEPTICEMIA DUE TO E. COLI (HCC): Status: RESOLVED | Noted: 2019-01-28 | Resolved: 2019-03-22

## 2019-03-22 LAB
A/G RATIO: 1.5 (ref 1.1–2.2)
ALBUMIN SERPL-MCNC: 4.1 G/DL (ref 3.4–5)
ALP BLD-CCNC: 48 U/L (ref 40–129)
ALT SERPL-CCNC: 13 U/L (ref 10–40)
ANION GAP SERPL CALCULATED.3IONS-SCNC: 12 MMOL/L (ref 3–16)
AST SERPL-CCNC: 12 U/L (ref 15–37)
BILIRUB SERPL-MCNC: <0.2 MG/DL (ref 0–1)
BUN BLDV-MCNC: 21 MG/DL (ref 7–20)
CALCIUM SERPL-MCNC: 9.7 MG/DL (ref 8.3–10.6)
CHLORIDE BLD-SCNC: 97 MMOL/L (ref 99–110)
CO2: 26 MMOL/L (ref 21–32)
CREAT SERPL-MCNC: 0.9 MG/DL (ref 0.6–1.2)
GFR AFRICAN AMERICAN: >60
GFR NON-AFRICAN AMERICAN: >60
GLOBULIN: 2.7 G/DL
GLUCOSE BLD-MCNC: 201 MG/DL (ref 70–99)
POTASSIUM SERPL-SCNC: 4.7 MMOL/L (ref 3.5–5.1)
SODIUM BLD-SCNC: 135 MMOL/L (ref 136–145)
TOTAL PROTEIN: 6.8 G/DL (ref 6.4–8.2)

## 2019-03-23 LAB — URINE CULTURE, ROUTINE: NORMAL

## 2019-03-26 ENCOUNTER — OFFICE VISIT (OUTPATIENT)
Dept: FAMILY MEDICINE CLINIC | Age: 84
End: 2019-03-26
Payer: MEDICARE

## 2019-03-26 VITALS
BODY MASS INDEX: 27.62 KG/M2 | TEMPERATURE: 97.4 F | WEIGHT: 166 LBS | OXYGEN SATURATION: 99 % | HEART RATE: 76 BPM | SYSTOLIC BLOOD PRESSURE: 119 MMHG | DIASTOLIC BLOOD PRESSURE: 71 MMHG

## 2019-03-26 DIAGNOSIS — R55 SYNCOPE, UNSPECIFIED SYNCOPE TYPE: ICD-10-CM

## 2019-03-26 DIAGNOSIS — M85.89 OSTEOPENIA OF MULTIPLE SITES: ICD-10-CM

## 2019-03-26 DIAGNOSIS — R91.8 ABNORMAL CXR WITH MULTIPLE NODULES: ICD-10-CM

## 2019-03-26 DIAGNOSIS — I10 BENIGN ESSENTIAL HYPERTENSION: ICD-10-CM

## 2019-03-26 DIAGNOSIS — E11.9 TYPE 2 DIABETES MELLITUS WITHOUT COMPLICATION, WITHOUT LONG-TERM CURRENT USE OF INSULIN (HCC): Primary | ICD-10-CM

## 2019-03-26 LAB — HBA1C MFR BLD: 8.2 %

## 2019-03-26 PROCEDURE — 99214 OFFICE O/P EST MOD 30 MIN: CPT | Performed by: FAMILY MEDICINE

## 2019-03-26 PROCEDURE — 83036 HEMOGLOBIN GLYCOSYLATED A1C: CPT | Performed by: FAMILY MEDICINE

## 2019-03-26 RX ORDER — ALENDRONATE SODIUM 70 MG/1
70 TABLET ORAL
Qty: 12 TABLET | Refills: 1 | Status: SHIPPED | OUTPATIENT
Start: 2019-03-26 | End: 2019-09-07 | Stop reason: SDUPTHER

## 2019-04-16 RX ORDER — SIMVASTATIN 20 MG
TABLET ORAL
Qty: 90 TABLET | Refills: 1 | Status: SHIPPED | OUTPATIENT
Start: 2019-04-16 | End: 2019-10-23 | Stop reason: SDUPTHER

## 2019-04-16 RX ORDER — PIOGLITAZONEHYDROCHLORIDE 30 MG/1
TABLET ORAL
Qty: 90 TABLET | Refills: 1 | Status: SHIPPED | OUTPATIENT
Start: 2019-04-16 | End: 2019-10-13 | Stop reason: SDUPTHER

## 2019-06-17 RX ORDER — ATENOLOL 25 MG/1
TABLET ORAL
Qty: 90 TABLET | Refills: 1 | Status: SHIPPED | OUTPATIENT
Start: 2019-06-17 | End: 2019-11-17 | Stop reason: SDUPTHER

## 2019-06-17 NOTE — TELEPHONE ENCOUNTER
Refilled medication per verbal order from provider.   Future appt scheduled 07/23/2019  Last appt 03/26/2019

## 2019-06-25 RX ORDER — GLIMEPIRIDE 2 MG/1
TABLET ORAL
Qty: 180 TABLET | Refills: 1 | Status: SHIPPED | OUTPATIENT
Start: 2019-06-25 | End: 2019-12-30

## 2019-07-23 ENCOUNTER — OFFICE VISIT (OUTPATIENT)
Dept: FAMILY MEDICINE CLINIC | Age: 84
End: 2019-07-23
Payer: MEDICARE

## 2019-07-23 VITALS
SYSTOLIC BLOOD PRESSURE: 133 MMHG | WEIGHT: 167 LBS | HEART RATE: 74 BPM | BODY MASS INDEX: 27.79 KG/M2 | TEMPERATURE: 98 F | OXYGEN SATURATION: 96 % | DIASTOLIC BLOOD PRESSURE: 71 MMHG

## 2019-07-23 DIAGNOSIS — E11.9 TYPE 2 DIABETES MELLITUS WITHOUT COMPLICATION, WITHOUT LONG-TERM CURRENT USE OF INSULIN (HCC): Primary | ICD-10-CM

## 2019-07-23 DIAGNOSIS — I10 BENIGN ESSENTIAL HYPERTENSION: ICD-10-CM

## 2019-07-23 DIAGNOSIS — M85.89 OSTEOPENIA OF MULTIPLE SITES: ICD-10-CM

## 2019-07-23 DIAGNOSIS — E53.8 B12 DEFICIENCY: ICD-10-CM

## 2019-07-23 LAB
ANION GAP SERPL CALCULATED.3IONS-SCNC: 14 MMOL/L (ref 3–16)
BASOPHILS ABSOLUTE: 0 K/UL (ref 0–0.2)
BASOPHILS RELATIVE PERCENT: 0.7 %
BUN BLDV-MCNC: 25 MG/DL (ref 7–20)
CALCIUM SERPL-MCNC: 10.2 MG/DL (ref 8.3–10.6)
CHLORIDE BLD-SCNC: 98 MMOL/L (ref 99–110)
CO2: 24 MMOL/L (ref 21–32)
CREAT SERPL-MCNC: 1 MG/DL (ref 0.6–1.2)
EOSINOPHILS ABSOLUTE: 0 K/UL (ref 0–0.6)
EOSINOPHILS RELATIVE PERCENT: 0.8 %
GFR AFRICAN AMERICAN: >60
GFR NON-AFRICAN AMERICAN: 53
GLUCOSE BLD-MCNC: 160 MG/DL (ref 70–99)
HBA1C MFR BLD: 7.2 %
HCT VFR BLD CALC: 36.2 % (ref 36–48)
HEMOGLOBIN: 11.7 G/DL (ref 12–16)
LYMPHOCYTES ABSOLUTE: 0.9 K/UL (ref 1–5.1)
LYMPHOCYTES RELATIVE PERCENT: 15.4 %
MCH RBC QN AUTO: 30.6 PG (ref 26–34)
MCHC RBC AUTO-ENTMCNC: 32.4 G/DL (ref 31–36)
MCV RBC AUTO: 94.7 FL (ref 80–100)
MONOCYTES ABSOLUTE: 0.3 K/UL (ref 0–1.3)
MONOCYTES RELATIVE PERCENT: 4.7 %
NEUTROPHILS ABSOLUTE: 4.8 K/UL (ref 1.7–7.7)
NEUTROPHILS RELATIVE PERCENT: 78.4 %
PDW BLD-RTO: 14.4 % (ref 12.4–15.4)
PLATELET # BLD: 195 K/UL (ref 135–450)
PMV BLD AUTO: 10.7 FL (ref 5–10.5)
POTASSIUM SERPL-SCNC: 4.6 MMOL/L (ref 3.5–5.1)
RBC # BLD: 3.83 M/UL (ref 4–5.2)
SODIUM BLD-SCNC: 136 MMOL/L (ref 136–145)
WBC # BLD: 6.2 K/UL (ref 4–11)

## 2019-07-23 PROCEDURE — 83036 HEMOGLOBIN GLYCOSYLATED A1C: CPT | Performed by: FAMILY MEDICINE

## 2019-07-23 PROCEDURE — 36415 COLL VENOUS BLD VENIPUNCTURE: CPT | Performed by: FAMILY MEDICINE

## 2019-07-23 PROCEDURE — 99214 OFFICE O/P EST MOD 30 MIN: CPT | Performed by: FAMILY MEDICINE

## 2019-07-23 NOTE — PATIENT INSTRUCTIONS
Please read the healthy family handout that you were given and share it with your family. Please compare this printed medication list with your medications at home to be sure they are the same. If you have any medications that are different please contact us immediately at [phone]. Also review your allergies that we have listed, these may also include medications that you have not been able to tolerate, make sure everything listed is correct. If you have any allergies that are different please contact us immediately at [phone].

## 2019-07-23 NOTE — PROGRESS NOTES
Assessment and plan  1. Type 2 diabetes mellitus without complication, without long-term current use of insulin ([facility])  Improved control  - POCT glycosylated hemoglobin (Hb A1C)  -  DIABETES FOOT EXAM  - CBC Auto Differential  - Basic Metabolic Panel    2. Benign essential hypertension  Stable    3. Osteopenia of multiple sites  Tolerating medication well  I did encourage her to take her vitamin D routinely    4. B12 deficiency   - status pending result of lab  - Methylmalonic Acid, Serum    Healthy Family prevention recommendations given. Continue all current prescription medications as listed below. RTC 4 months or sooner prn. Subjective  Patient returns for reevaluation of her medical problems including diabetes, hypertension, osteopenia, and vitamin B12 deficiency. Her A1c is down to 7.2%, she reports she feels very well. Her blood pressures been good. She is tolerating the alendronate well. However she has a history of vitamin D deficiency and stopped taking her vitamin D for reasons not clear to me. She also has a history of vitamin B12 deficiency and has not been taking it either. She wants to know if she really needs to. Medications: see list below  Allergies   Allergen Reactions    Morphine Nausea And Vomiting     Past history:  She reports she recently saw her eye doctor.     Review of systems:  Constitutional:  fatigue - no                                                  abnormal weight loss - no  Cardiac:  chest pain or discomfort - no                 lightheadedness - no  Respiratory: wheezing - no                       dyspnea on exertion - no            unusual cough - no  Gastrointestinal:  frequent heartburn- no                             dysphagia - no  Urologic:  Hematuria - no                   Dysuria - no    Objective  /71   Pulse 74   Temp 98 °F (36.7 °C) (Oral)   Wt 167 lb (75.8 kg)   SpO2 96%   BMI 27.79 kg/m²   Patient is alert, oriented, and in no acute distress  Psych:  mood and affect are unremarkable               speech and thought processes appear intact               Behavior and appearance unremarkable  Neck:  No masses, trachea midline, phonation normal   Thyroid - unremarkable              Cervical  adenopathy - nothing significant  Chest:  No deformity of thorax               Respirations easy and unlabored              Lungs - clear to auscultation     Breath sounds - equal  Heart: Regular rhythm with no murmur, rub or gallop. No JVD. Pretibial pitting edema - none. Foot exam: ulcerations - none           pulses - intact                        cyanosis  or ischemic changes- none                        callouses - none                        deformities -bilateral hammertoes                        multi-point monofilament sensation testing - normal     Yaneli Ryan MD    Current Outpatient Medications   Medication Sig Dispense Refill    glimepiride (AMARYL) 2 MG tablet TAKE ONE TABLET BY MOUTH TWICE A  tablet 1    atenolol (TENORMIN) 25 MG tablet TAKE ONE TABLET BY MOUTH DAILY 90 tablet 1    pioglitazone (ACTOS) 30 MG tablet TAKE ONE TABLET BY MOUTH DAILY 90 tablet 1    simvastatin (ZOCOR) 20 MG tablet TAKE ONE TABLET BY MOUTH ONCE NIGHTLY 90 tablet 1    alendronate (FOSAMAX) 70 MG tablet Take 1 tablet by mouth every 7 days With 2 large glasses of water on an empty stomach 12 tablet 1    blood glucose test strips (ASCENSIA AUTODISC VI;ONE TOUCH ULTRA TEST VI) strip 1 each by In Vitro route daily As needed.  100 each 5    lisinopril (PRINIVIL;ZESTRIL) 20 MG tablet TAKE ONE TABLET BY MOUTH DAILY 90 tablet 1    albuterol sulfate  (90 Base) MCG/ACT inhaler Inhale 2 puffs into the lungs 4 times daily as needed for Wheezing 1 Inhaler 1    metFORMIN (GLUCOPHAGE) 1000 MG tablet TAKE ONE TABLET BY MOUTH TWICE A DAY WITH MEALS 180 tablet 1    Melatonin 5 MG CAPS Take 5 mg by mouth nightly as needed (sleep) 1 capsule 0    sitaGLIPtin

## 2019-07-26 LAB — METHYLMALONIC ACID: 0.41 UMOL/L (ref 0–0.4)

## 2019-07-30 ENCOUNTER — TELEPHONE (OUTPATIENT)
Dept: FAMILY MEDICINE CLINIC | Age: 84
End: 2019-07-30

## 2019-07-31 ENCOUNTER — TELEPHONE (OUTPATIENT)
Dept: FAMILY MEDICINE CLINIC | Age: 84
End: 2019-07-31

## 2019-07-31 NOTE — TELEPHONE ENCOUNTER
Patient was to call back today with blood pressure reading after taking an extra half of lisinopril 20 mg, bp was 160/77, please advise.

## 2019-08-01 ENCOUNTER — NURSE ONLY (OUTPATIENT)
Dept: FAMILY MEDICINE CLINIC | Age: 84
End: 2019-08-01

## 2019-08-01 ENCOUNTER — TELEPHONE (OUTPATIENT)
Dept: FAMILY MEDICINE CLINIC | Age: 84
End: 2019-08-01

## 2019-08-01 DIAGNOSIS — R30.0 DYSURIA: Primary | ICD-10-CM

## 2019-08-02 ENCOUNTER — TELEPHONE (OUTPATIENT)
Dept: FAMILY MEDICINE CLINIC | Age: 84
End: 2019-08-02

## 2019-08-02 LAB
BACTERIA: ABNORMAL /HPF
BILIRUBIN URINE: NEGATIVE
BLOOD, URINE: NEGATIVE
CLARITY: ABNORMAL
COLOR: YELLOW
EPITHELIAL CELLS, UA: ABNORMAL /HPF
GLUCOSE URINE: NEGATIVE MG/DL
KETONES, URINE: NEGATIVE MG/DL
LEUKOCYTE ESTERASE, URINE: ABNORMAL
MICROSCOPIC EXAMINATION: YES
NITRITE, URINE: NEGATIVE
PH UA: 6 (ref 5–8)
PROTEIN UA: NEGATIVE MG/DL
RBC UA: ABNORMAL /HPF (ref 0–2)
SPECIFIC GRAVITY UA: 1.01 (ref 1–1.03)
UROBILINOGEN, URINE: 0.2 E.U./DL
WBC UA: ABNORMAL /HPF (ref 0–5)

## 2019-08-02 RX ORDER — CIPROFLOXACIN 250 MG/1
250 TABLET, FILM COATED ORAL 2 TIMES DAILY
Qty: 14 TABLET | Refills: 0 | Status: SHIPPED | OUTPATIENT
Start: 2019-08-02 | End: 2019-08-06

## 2019-08-04 LAB
ORGANISM: ABNORMAL
URINE CULTURE, ROUTINE: ABNORMAL
URINE CULTURE, ROUTINE: ABNORMAL

## 2019-08-06 ENCOUNTER — OFFICE VISIT (OUTPATIENT)
Dept: FAMILY MEDICINE CLINIC | Age: 84
End: 2019-08-06
Payer: MEDICARE

## 2019-08-06 VITALS
WEIGHT: 172.8 LBS | DIASTOLIC BLOOD PRESSURE: 71 MMHG | HEART RATE: 72 BPM | TEMPERATURE: 98.4 F | SYSTOLIC BLOOD PRESSURE: 137 MMHG | OXYGEN SATURATION: 97 % | BODY MASS INDEX: 28.76 KG/M2

## 2019-08-06 DIAGNOSIS — I10 BENIGN ESSENTIAL HYPERTENSION: Primary | ICD-10-CM

## 2019-08-06 DIAGNOSIS — N39.0 UTI DUE TO EXTENDED-SPECTRUM BETA LACTAMASE (ESBL) PRODUCING ESCHERICHIA COLI: ICD-10-CM

## 2019-08-06 DIAGNOSIS — Z16.12 UTI DUE TO EXTENDED-SPECTRUM BETA LACTAMASE (ESBL) PRODUCING ESCHERICHIA COLI: ICD-10-CM

## 2019-08-06 DIAGNOSIS — B96.29 UTI DUE TO EXTENDED-SPECTRUM BETA LACTAMASE (ESBL) PRODUCING ESCHERICHIA COLI: ICD-10-CM

## 2019-08-06 PROCEDURE — 99214 OFFICE O/P EST MOD 30 MIN: CPT | Performed by: FAMILY MEDICINE

## 2019-08-06 RX ORDER — AMOXICILLIN AND CLAVULANATE POTASSIUM 875; 125 MG/1; MG/1
1 TABLET, FILM COATED ORAL 2 TIMES DAILY
Qty: 20 TABLET | Refills: 0 | Status: SHIPPED | OUTPATIENT
Start: 2019-08-06 | End: 2019-08-16

## 2019-08-06 NOTE — PATIENT INSTRUCTIONS
Please compare this printed medication list with your medications at home to be sure they are the same. If you have any medications that are different please contact us immediately at [phone]. Also review your allergies that we have listed, these may also include medications that you have not been able to tolerate, make sure everything listed is correct. If you have any allergies that are different please contact us immediately at [phone].

## 2019-08-06 NOTE — PROGRESS NOTES
Assessment and plan  1. Benign essential hypertension  Now stable. 2. UTI due to extended-spectrum beta lactamase (ESBL) producing Escherichia coli  Seems to be asymptomatic. However, considering the organism, will change ciprofloxacin to amoxicillin-clavulanate (AUGMENTIN) 875-125 MG per tablet; Take 1 tablet by mouth 2 times daily for 10 days    -Repeat urine culture in about 12 days    Return to clinic or call prn if these symptoms worsen or fail to improve and resolve as discussed. Otherwise follow-up as scheduled    Subjective  Patient presents with 2 issues. The first is on July 30 she had a witnessed syncopal spell. Her blood pressure was 200/74. She reports she felt well before this, had no aura or prodrome. We increased her lisinopril with an extra 10 mg a day. The following day her blood pressure was down to 160/77 and she was instructed to keep her current dose of 30 mg of lisinopril a day. Daughter call back the next day concerned she may have a UTI. She became septic from E. coli within the last year. We called in [facility] pending a urine culture. She grew out E.coli ESBL assistant to ciprofloxacin. Past medical history is pertinent for allergy to morphine, no other drug allergies    Review of systems:  Negative for any further syncope, headache, or dizziness  Negative for fever or chills  Negative for dysuria hematuria urinary frequency or urgency    Objective  /71   Pulse 72   Temp 98.4 °F (36.9 °C) (Oral)   Wt 172 lb 12.8 oz (78.4 kg)   SpO2 97%   BMI 28.76 kg/m²   Patient is alert, oriented, and in no acute distress.   Psych: mood and affect unremarkable                speech and thought processes intact  Neck:  No masses, trachea midline, phonation normal, thyroid unremarkable              No significant cervical or supraclavicular adenopathy  Chest:  No deformity of thorax              Respirations easy and unlabored with equal breath sounds              Lungs clear  Heart:

## 2019-08-12 ENCOUNTER — NURSE ONLY (OUTPATIENT)
Dept: FAMILY MEDICINE CLINIC | Age: 84
End: 2019-08-12
Payer: MEDICARE

## 2019-08-12 DIAGNOSIS — N39.0 UTI DUE TO EXTENDED-SPECTRUM BETA LACTAMASE (ESBL) PRODUCING ESCHERICHIA COLI: Primary | ICD-10-CM

## 2019-08-12 DIAGNOSIS — B96.29 UTI DUE TO EXTENDED-SPECTRUM BETA LACTAMASE (ESBL) PRODUCING ESCHERICHIA COLI: Primary | ICD-10-CM

## 2019-08-12 DIAGNOSIS — Z16.12 UTI DUE TO EXTENDED-SPECTRUM BETA LACTAMASE (ESBL) PRODUCING ESCHERICHIA COLI: Primary | ICD-10-CM

## 2019-08-12 LAB
BILIRUBIN, POC: ABNORMAL
BLOOD URINE, POC: ABNORMAL
CLARITY, POC: CLEAR
COLOR, POC: ABNORMAL
GLUCOSE URINE, POC: ABNORMAL
KETONES, POC: ABNORMAL
LEUKOCYTE EST, POC: ABNORMAL
NITRITE, POC: ABNORMAL
PH, POC: 5.5
PROTEIN, POC: ABNORMAL
SPECIFIC GRAVITY, POC: 1.01
UROBILINOGEN, POC: 0.2

## 2019-08-12 PROCEDURE — 81002 URINALYSIS NONAUTO W/O SCOPE: CPT | Performed by: FAMILY MEDICINE

## 2019-08-19 RX ORDER — LISINOPRIL 20 MG/1
TABLET ORAL
Qty: 90 TABLET | Refills: 1 | Status: SHIPPED | OUTPATIENT
Start: 2019-08-19 | End: 2020-01-13

## 2019-09-09 RX ORDER — ALENDRONATE SODIUM 70 MG/1
TABLET ORAL
Qty: 12 TABLET | Refills: 0 | Status: SHIPPED | OUTPATIENT
Start: 2019-09-09 | End: 2019-11-18 | Stop reason: SDUPTHER

## 2019-09-25 ENCOUNTER — OFFICE VISIT (OUTPATIENT)
Dept: FAMILY MEDICINE CLINIC | Age: 84
End: 2019-09-25
Payer: MEDICARE

## 2019-09-25 VITALS
BODY MASS INDEX: 29.79 KG/M2 | OXYGEN SATURATION: 97 % | TEMPERATURE: 98.1 F | SYSTOLIC BLOOD PRESSURE: 137 MMHG | HEART RATE: 77 BPM | WEIGHT: 179 LBS | DIASTOLIC BLOOD PRESSURE: 72 MMHG

## 2019-09-25 DIAGNOSIS — N39.0 RECURRENT UTI: Primary | ICD-10-CM

## 2019-09-25 LAB
BILIRUBIN, POC: NEGATIVE
BLOOD URINE, POC: NEGATIVE
CLARITY, POC: CLEAR
COLOR, POC: YELLOW
GLUCOSE URINE, POC: NEGATIVE
KETONES, POC: NEGATIVE
LEUKOCYTE EST, POC: ABNORMAL
NITRITE, POC: NEGATIVE
PH, POC: 6
PROTEIN, POC: NEGATIVE
SPECIFIC GRAVITY, POC: 1.01
UROBILINOGEN, POC: 0.2

## 2019-09-25 PROCEDURE — 99213 OFFICE O/P EST LOW 20 MIN: CPT | Performed by: FAMILY MEDICINE

## 2019-09-25 PROCEDURE — 81002 URINALYSIS NONAUTO W/O SCOPE: CPT | Performed by: FAMILY MEDICINE

## 2019-09-25 RX ORDER — SULFAMETHOXAZOLE AND TRIMETHOPRIM 800; 160 MG/1; MG/1
1 TABLET ORAL 2 TIMES DAILY
Qty: 20 TABLET | Refills: 0 | Status: SHIPPED | OUTPATIENT
Start: 2019-09-25 | End: 2019-10-05

## 2019-09-25 NOTE — PATIENT INSTRUCTIONS
Please compare this printed medication list with your medications at home to be sure they are the same. If you have any medications that are different please contact us immediately at [phone]. Also review your allergies that we have listed, these may also include medications that you have not been able to tolerate, make sure everything listed is correct. If you have any allergies that are different please contact us immediately at [phone].

## 2019-09-25 NOTE — PROGRESS NOTES
Assessment and plan  1. Recurrent UTI  - POCT Urinalysis no Micro  - Urine Culture  - conjugated estrogens (PREMARIN) 0.625 MG/GM vaginal cream; Place around urethra daily  Dispense: 1 Tube; Refill: 5  - sulfamethoxazole-trimethoprim (BACTRIM DS;SEPTRA DS) 800-160 MG per tablet; Take 1 tablet by mouth 2 times daily for 10 days  Dispense: 20 tablet; Refill: 0  -Discussed possible urology referral pending response. RTC prn or call if symptoms don't improve or resolve as discussed    Subjective  Patient returns concerned she has another UTI. In August she had a urine culture positive for E. coli EBSL. However her symptoms seem to clear with Augmentin. Recently she is developed fatigue and weakness which typically are her only symptoms. No fever chills dysuria hematuria or back pain. She got a kit at the drugstore to test her urine and it was positive. Her UA today is positive as well. She was septic with E. coli earlier this year. Objective  /72   Pulse 77   Temp 98.1 °F (36.7 °C) (Oral)   Wt 179 lb (81.2 kg)   SpO2 97%   BMI 29.79 kg/m²   Patient in no acute distress. Urinalysis is abnormal.    Rajat Handley MD    The note was completed using Dragon voice recognition transcription. Every effort was made to ensure accuracy; however, inadvertent  transcription errors may be present despite my best efforts to edit errors.

## 2019-09-27 DIAGNOSIS — N39.0 URINARY TRACT INFECTION WITHOUT HEMATURIA, SITE UNSPECIFIED: ICD-10-CM

## 2019-09-27 LAB
ORGANISM: ABNORMAL
URINE CULTURE, ROUTINE: ABNORMAL

## 2019-09-30 ENCOUNTER — HOSPITAL ENCOUNTER (OUTPATIENT)
Age: 84
Discharge: HOME | End: 2019-09-30
Payer: MEDICARE

## 2019-09-30 ENCOUNTER — HOSPITAL ENCOUNTER (OUTPATIENT)
Dept: INTERVENTIONAL RADIOLOGY/VASCULAR | Age: 84
Discharge: HOME | End: 2019-09-30
Payer: MEDICARE

## 2019-09-30 ENCOUNTER — TELEPHONE (OUTPATIENT)
Dept: FAMILY MEDICINE CLINIC | Age: 84
End: 2019-09-30

## 2019-09-30 ENCOUNTER — HOSPITAL ENCOUNTER (OUTPATIENT)
Dept: NURSING | Age: 84
Setting detail: INFUSION SERIES
Discharge: HOME | End: 2019-09-30
Payer: MEDICARE

## 2019-09-30 ENCOUNTER — HOSPITAL ENCOUNTER (OUTPATIENT)
Dept: GENERAL RADIOLOGY | Age: 84
Discharge: HOME | End: 2019-09-30
Payer: MEDICARE

## 2019-09-30 VITALS
OXYGEN SATURATION: 97 % | WEIGHT: 170 LBS | SYSTOLIC BLOOD PRESSURE: 174 MMHG | RESPIRATION RATE: 18 BRPM | DIASTOLIC BLOOD PRESSURE: 75 MMHG | BODY MASS INDEX: 27.32 KG/M2 | HEART RATE: 72 BPM | HEIGHT: 66 IN | TEMPERATURE: 97.9 F

## 2019-09-30 DIAGNOSIS — N39.0 URINARY TRACT INFECTION WITHOUT HEMATURIA, SITE UNSPECIFIED: Primary | ICD-10-CM

## 2019-09-30 DIAGNOSIS — Z79.2 RECEIVING INTRAVENOUS ANTIBIOTIC TREATMENT AT HOME: ICD-10-CM

## 2019-09-30 DIAGNOSIS — Z23 FLU VACCINE NEED: Primary | ICD-10-CM

## 2019-09-30 LAB
INR BLD: 1.02 (ref 0.86–1.14)
PLATELET # BLD: 153 K/UL (ref 135–450)
PROTHROMBIN TIME: 11.6 SEC (ref 9.8–13)

## 2019-09-30 PROCEDURE — 96365 THER/PROPH/DIAG IV INF INIT: CPT

## 2019-09-30 PROCEDURE — 6360000002 HC RX W HCPCS: Performed by: FAMILY MEDICINE

## 2019-09-30 PROCEDURE — 2580000003 HC RX 258: Performed by: FAMILY MEDICINE

## 2019-09-30 PROCEDURE — 85610 PROTHROMBIN TIME: CPT

## 2019-09-30 PROCEDURE — 36415 COLL VENOUS BLD VENIPUNCTURE: CPT

## 2019-09-30 PROCEDURE — 36573 INSJ PICC RS&I 5 YR+: CPT

## 2019-09-30 PROCEDURE — C1751 CATH, INF, PER/CENT/MIDLINE: HCPCS

## 2019-09-30 PROCEDURE — 85049 AUTOMATED PLATELET COUNT: CPT

## 2019-09-30 RX ADMIN — MEROPENEM 1 G: 1 INJECTION INTRAVENOUS at 09:33

## 2019-09-30 ASSESSMENT — PAIN - FUNCTIONAL ASSESSMENT: PAIN_FUNCTIONAL_ASSESSMENT: 0-10

## 2019-09-30 NOTE — PROGRESS NOTES
Insertion site bleeding and large clot at securing device. Dressing changed using sterile procedure completed with replacement of Bio patch and securing device. D/C instructions reviewed with Pt and daughter,stated understanding. Pt wheeled to front door without complaints and in stable condition.

## 2019-09-30 NOTE — PROGRESS NOTES
Pt here with daughter for first IV infusion. PIC line in Rt upper arm. Brisk bllod return and flushed easily. Medication ordered from Pharmacy.

## 2019-10-04 PROBLEM — A49.8 INFECTION DUE TO ESBL-PRODUCING ESCHERICHIA COLI: Status: ACTIVE | Noted: 2019-10-04

## 2019-10-04 PROBLEM — Z16.12 INFECTION DUE TO ESBL-PRODUCING ESCHERICHIA COLI: Status: ACTIVE | Noted: 2019-10-04

## 2019-10-08 ENCOUNTER — TELEPHONE (OUTPATIENT)
Dept: FAMILY MEDICINE CLINIC | Age: 84
End: 2019-10-08

## 2019-10-09 DIAGNOSIS — N39.0 RECURRENT UTI: ICD-10-CM

## 2019-10-09 DIAGNOSIS — E11.9 TYPE 2 DIABETES MELLITUS WITHOUT COMPLICATION, WITHOUT LONG-TERM CURRENT USE OF INSULIN (HCC): ICD-10-CM

## 2019-10-09 DIAGNOSIS — I10 BENIGN ESSENTIAL HYPERTENSION: Primary | ICD-10-CM

## 2019-10-10 ENCOUNTER — TELEPHONE (OUTPATIENT)
Dept: FAMILY MEDICINE CLINIC | Age: 84
End: 2019-10-10

## 2019-10-14 ENCOUNTER — NURSE ONLY (OUTPATIENT)
Dept: FAMILY MEDICINE CLINIC | Age: 84
End: 2019-10-14
Payer: MEDICARE

## 2019-10-14 DIAGNOSIS — N39.0 RECURRENT UTI: Primary | ICD-10-CM

## 2019-10-14 PROCEDURE — 81003 URINALYSIS AUTO W/O SCOPE: CPT | Performed by: FAMILY MEDICINE

## 2019-10-14 RX ORDER — PIOGLITAZONEHYDROCHLORIDE 30 MG/1
TABLET ORAL
Qty: 90 TABLET | Refills: 0 | Status: SHIPPED | OUTPATIENT
Start: 2019-10-14 | End: 2020-01-13

## 2019-10-16 LAB — URINE CULTURE, ROUTINE: NORMAL

## 2019-10-23 RX ORDER — SIMVASTATIN 20 MG
TABLET ORAL
Qty: 90 TABLET | Refills: 1 | Status: SHIPPED | OUTPATIENT
Start: 2019-10-23 | End: 2020-05-11

## 2019-10-27 PROBLEM — N39.0 URINARY TRACT INFECTION: Status: RESOLVED | Noted: 2019-09-27 | Resolved: 2019-10-27

## 2019-11-07 ENCOUNTER — CARE COORDINATION (OUTPATIENT)
Dept: CARE COORDINATION | Age: 84
End: 2019-11-07

## 2019-11-18 RX ORDER — ATENOLOL 25 MG/1
TABLET ORAL
Qty: 90 TABLET | Refills: 1 | Status: SHIPPED | OUTPATIENT
Start: 2019-11-18 | End: 2020-06-22

## 2019-11-18 RX ORDER — ALENDRONATE SODIUM 70 MG/1
TABLET ORAL
Qty: 12 TABLET | Refills: 0 | Status: SHIPPED | OUTPATIENT
Start: 2019-11-18 | End: 2020-02-17

## 2019-12-02 PROBLEM — A49.8 INFECTION DUE TO ESBL-PRODUCING ESCHERICHIA COLI: Status: RESOLVED | Noted: 2019-10-04 | Resolved: 2019-12-02

## 2019-12-02 PROBLEM — Z16.12 INFECTION DUE TO ESBL-PRODUCING ESCHERICHIA COLI: Status: RESOLVED | Noted: 2019-10-04 | Resolved: 2019-12-02

## 2019-12-03 ENCOUNTER — OFFICE VISIT (OUTPATIENT)
Dept: FAMILY MEDICINE CLINIC | Age: 84
End: 2019-12-03
Payer: MEDICARE

## 2019-12-03 VITALS
HEART RATE: 62 BPM | TEMPERATURE: 97.8 F | DIASTOLIC BLOOD PRESSURE: 76 MMHG | WEIGHT: 172.5 LBS | OXYGEN SATURATION: 97 % | BODY MASS INDEX: 27.84 KG/M2 | SYSTOLIC BLOOD PRESSURE: 178 MMHG

## 2019-12-03 DIAGNOSIS — E11.9 TYPE 2 DIABETES MELLITUS WITHOUT COMPLICATION, WITHOUT LONG-TERM CURRENT USE OF INSULIN (HCC): Primary | ICD-10-CM

## 2019-12-03 DIAGNOSIS — N39.0 RECURRENT UTI: ICD-10-CM

## 2019-12-03 DIAGNOSIS — I10 BENIGN ESSENTIAL HYPERTENSION: ICD-10-CM

## 2019-12-03 DIAGNOSIS — E53.8 B12 DEFICIENCY: ICD-10-CM

## 2019-12-03 LAB — HBA1C MFR BLD: 7.7 %

## 2019-12-03 PROCEDURE — 96372 THER/PROPH/DIAG INJ SC/IM: CPT | Performed by: FAMILY MEDICINE

## 2019-12-03 PROCEDURE — 3288F FALL RISK ASSESSMENT DOCD: CPT | Performed by: FAMILY MEDICINE

## 2019-12-03 PROCEDURE — 99214 OFFICE O/P EST MOD 30 MIN: CPT | Performed by: FAMILY MEDICINE

## 2019-12-03 PROCEDURE — 83036 HEMOGLOBIN GLYCOSYLATED A1C: CPT | Performed by: FAMILY MEDICINE

## 2019-12-03 RX ORDER — CYANOCOBALAMIN 1000 UG/ML
1000 INJECTION INTRAMUSCULAR; SUBCUTANEOUS ONCE
Status: COMPLETED | OUTPATIENT
Start: 2019-12-03 | End: 2019-12-03

## 2019-12-03 RX ADMIN — CYANOCOBALAMIN 1000 MCG: 1000 INJECTION INTRAMUSCULAR; SUBCUTANEOUS at 11:20

## 2019-12-10 ENCOUNTER — NURSE ONLY (OUTPATIENT)
Dept: FAMILY MEDICINE CLINIC | Age: 84
End: 2019-12-10
Payer: MEDICARE

## 2019-12-10 PROCEDURE — 96372 THER/PROPH/DIAG INJ SC/IM: CPT | Performed by: FAMILY MEDICINE

## 2019-12-10 RX ORDER — CYANOCOBALAMIN 1000 UG/ML
1000 INJECTION INTRAMUSCULAR; SUBCUTANEOUS ONCE
Status: COMPLETED | OUTPATIENT
Start: 2019-12-10 | End: 2019-12-10

## 2019-12-10 RX ADMIN — CYANOCOBALAMIN 1000 MCG: 1000 INJECTION INTRAMUSCULAR; SUBCUTANEOUS at 09:19

## 2019-12-17 ENCOUNTER — NURSE ONLY (OUTPATIENT)
Dept: FAMILY MEDICINE CLINIC | Age: 84
End: 2019-12-17
Payer: MEDICARE

## 2019-12-17 DIAGNOSIS — E53.8 B12 DEFICIENCY: Primary | ICD-10-CM

## 2019-12-17 PROCEDURE — 96372 THER/PROPH/DIAG INJ SC/IM: CPT | Performed by: FAMILY MEDICINE

## 2019-12-17 RX ORDER — CYANOCOBALAMIN 1000 UG/ML
1000 INJECTION INTRAMUSCULAR; SUBCUTANEOUS ONCE
Status: COMPLETED | OUTPATIENT
Start: 2019-12-17 | End: 2019-12-17

## 2019-12-17 RX ADMIN — CYANOCOBALAMIN 1000 MCG: 1000 INJECTION INTRAMUSCULAR; SUBCUTANEOUS at 09:14

## 2019-12-23 ENCOUNTER — NURSE ONLY (OUTPATIENT)
Dept: FAMILY MEDICINE CLINIC | Age: 84
End: 2019-12-23
Payer: MEDICARE

## 2019-12-23 DIAGNOSIS — E53.8 B12 DEFICIENCY: Primary | ICD-10-CM

## 2019-12-23 PROCEDURE — 96372 THER/PROPH/DIAG INJ SC/IM: CPT | Performed by: FAMILY MEDICINE

## 2019-12-23 RX ORDER — CYANOCOBALAMIN 1000 UG/ML
1000 INJECTION INTRAMUSCULAR; SUBCUTANEOUS ONCE
Status: COMPLETED | OUTPATIENT
Start: 2019-12-23 | End: 2019-12-23

## 2019-12-23 RX ADMIN — CYANOCOBALAMIN 1000 MCG: 1000 INJECTION INTRAMUSCULAR; SUBCUTANEOUS at 10:22

## 2019-12-30 RX ORDER — GLIMEPIRIDE 2 MG/1
TABLET ORAL
Qty: 180 TABLET | Refills: 1 | Status: SHIPPED | OUTPATIENT
Start: 2019-12-30 | End: 2020-07-21

## 2020-01-13 RX ORDER — LISINOPRIL 20 MG/1
TABLET ORAL
Qty: 90 TABLET | Refills: 0 | Status: SHIPPED | OUTPATIENT
Start: 2020-01-13 | End: 2020-03-24

## 2020-01-13 RX ORDER — PIOGLITAZONEHYDROCHLORIDE 30 MG/1
TABLET ORAL
Qty: 90 TABLET | Refills: 0 | Status: SHIPPED | OUTPATIENT
Start: 2020-01-13 | End: 2020-04-27

## 2020-01-20 NOTE — TELEPHONE ENCOUNTER
Future appt scheduled 04/07/2020  Last appt 12/03/2019      Last Written 10/14/2019      metFORMIN (GLUCOPHAGE) 1000 MG tablet  #180  0 RF

## 2020-01-21 ENCOUNTER — NURSE ONLY (OUTPATIENT)
Dept: FAMILY MEDICINE CLINIC | Age: 85
End: 2020-01-21
Payer: MEDICARE

## 2020-01-21 PROCEDURE — 96372 THER/PROPH/DIAG INJ SC/IM: CPT | Performed by: FAMILY MEDICINE

## 2020-01-21 RX ORDER — CYANOCOBALAMIN 1000 UG/ML
1000 INJECTION INTRAMUSCULAR; SUBCUTANEOUS ONCE
Status: COMPLETED | OUTPATIENT
Start: 2020-01-21 | End: 2020-01-21

## 2020-01-21 RX ADMIN — CYANOCOBALAMIN 1000 MCG: 1000 INJECTION INTRAMUSCULAR; SUBCUTANEOUS at 09:31

## 2020-02-17 RX ORDER — ALENDRONATE SODIUM 70 MG/1
TABLET ORAL
Qty: 12 TABLET | Refills: 1 | Status: SHIPPED | OUTPATIENT
Start: 2020-02-17 | End: 2020-07-30

## 2020-02-25 ENCOUNTER — NURSE ONLY (OUTPATIENT)
Dept: FAMILY MEDICINE CLINIC | Age: 85
End: 2020-02-25
Payer: MEDICARE

## 2020-02-25 PROCEDURE — 96372 THER/PROPH/DIAG INJ SC/IM: CPT | Performed by: FAMILY MEDICINE

## 2020-02-25 RX ORDER — CYANOCOBALAMIN 1000 UG/ML
1000 INJECTION INTRAMUSCULAR; SUBCUTANEOUS ONCE
Status: COMPLETED | OUTPATIENT
Start: 2020-02-25 | End: 2020-02-25

## 2020-02-25 RX ADMIN — CYANOCOBALAMIN 1000 MCG: 1000 INJECTION INTRAMUSCULAR; SUBCUTANEOUS at 09:07

## 2020-03-24 RX ORDER — LISINOPRIL 20 MG/1
30 TABLET ORAL DAILY
Qty: 45 TABLET | Refills: 5 | Status: SHIPPED | OUTPATIENT
Start: 2020-03-24 | End: 2020-09-23

## 2020-03-24 NOTE — TELEPHONE ENCOUNTER
Patient called for a refill on Lisinopril. She states she is taking 20mg in the am and 10mg in the pm. There is no change on the med list. I looked through the notes could not find any changes.

## 2020-04-27 RX ORDER — PIOGLITAZONEHYDROCHLORIDE 30 MG/1
TABLET ORAL
Qty: 90 TABLET | Refills: 1 | Status: SHIPPED | OUTPATIENT
Start: 2020-04-27 | End: 2020-10-27

## 2020-05-11 RX ORDER — SIMVASTATIN 20 MG
TABLET ORAL
Qty: 90 TABLET | Refills: 0 | Status: SHIPPED | OUTPATIENT
Start: 2020-05-11 | End: 2020-08-10

## 2020-06-22 RX ORDER — ATENOLOL 25 MG/1
TABLET ORAL
Qty: 90 TABLET | Refills: 0 | Status: SHIPPED | OUTPATIENT
Start: 2020-06-22 | End: 2020-09-23

## 2020-06-26 ENCOUNTER — OFFICE VISIT (OUTPATIENT)
Dept: FAMILY MEDICINE CLINIC | Age: 85
End: 2020-06-26
Payer: MEDICARE

## 2020-06-26 VITALS
OXYGEN SATURATION: 97 % | SYSTOLIC BLOOD PRESSURE: 138 MMHG | BODY MASS INDEX: 27.83 KG/M2 | TEMPERATURE: 97.4 F | WEIGHT: 172.4 LBS | HEART RATE: 76 BPM | DIASTOLIC BLOOD PRESSURE: 74 MMHG

## 2020-06-26 LAB
A/G RATIO: 1.6 (ref 1.1–2.2)
ALBUMIN SERPL-MCNC: 3.9 G/DL (ref 3.4–5)
ALP BLD-CCNC: 35 U/L (ref 40–129)
ALT SERPL-CCNC: 10 U/L (ref 10–40)
ANION GAP SERPL CALCULATED.3IONS-SCNC: 15 MMOL/L (ref 3–16)
AST SERPL-CCNC: 11 U/L (ref 15–37)
BASOPHILS ABSOLUTE: 0 K/UL (ref 0–0.2)
BASOPHILS RELATIVE PERCENT: 0.6 %
BILIRUB SERPL-MCNC: <0.2 MG/DL (ref 0–1)
BUN BLDV-MCNC: 29 MG/DL (ref 7–20)
CALCIUM SERPL-MCNC: 9.3 MG/DL (ref 8.3–10.6)
CHLORIDE BLD-SCNC: 99 MMOL/L (ref 99–110)
CHOLESTEROL, TOTAL: 126 MG/DL (ref 0–199)
CO2: 22 MMOL/L (ref 21–32)
CREAT SERPL-MCNC: 1 MG/DL (ref 0.6–1.2)
EOSINOPHILS ABSOLUTE: 0 K/UL (ref 0–0.6)
EOSINOPHILS RELATIVE PERCENT: 0.8 %
GFR AFRICAN AMERICAN: >60
GFR NON-AFRICAN AMERICAN: 53
GLOBULIN: 2.5 G/DL
GLUCOSE BLD-MCNC: 161 MG/DL (ref 70–99)
HCT VFR BLD CALC: 36.8 % (ref 36–48)
HDLC SERPL-MCNC: 42 MG/DL (ref 40–60)
HEMOGLOBIN: 11.8 G/DL (ref 12–16)
LDL CHOLESTEROL CALCULATED: 62 MG/DL
LYMPHOCYTES ABSOLUTE: 0.7 K/UL (ref 1–5.1)
LYMPHOCYTES RELATIVE PERCENT: 13.2 %
MCH RBC QN AUTO: 30.6 PG (ref 26–34)
MCHC RBC AUTO-ENTMCNC: 32.1 G/DL (ref 31–36)
MCV RBC AUTO: 95.2 FL (ref 80–100)
MONOCYTES ABSOLUTE: 0.3 K/UL (ref 0–1.3)
MONOCYTES RELATIVE PERCENT: 5.1 %
NEUTROPHILS ABSOLUTE: 4.5 K/UL (ref 1.7–7.7)
NEUTROPHILS RELATIVE PERCENT: 80.3 %
PDW BLD-RTO: 14.7 % (ref 12.4–15.4)
PLATELET # BLD: 185 K/UL (ref 135–450)
PMV BLD AUTO: 11.1 FL (ref 5–10.5)
POTASSIUM SERPL-SCNC: 4.9 MMOL/L (ref 3.5–5.1)
RBC # BLD: 3.87 M/UL (ref 4–5.2)
SODIUM BLD-SCNC: 136 MMOL/L (ref 136–145)
TOTAL PROTEIN: 6.4 G/DL (ref 6.4–8.2)
TRIGL SERPL-MCNC: 110 MG/DL (ref 0–150)
VLDLC SERPL CALC-MCNC: 22 MG/DL
WBC # BLD: 5.7 K/UL (ref 4–11)

## 2020-06-26 PROCEDURE — 36415 COLL VENOUS BLD VENIPUNCTURE: CPT | Performed by: FAMILY MEDICINE

## 2020-06-26 PROCEDURE — 96372 THER/PROPH/DIAG INJ SC/IM: CPT | Performed by: FAMILY MEDICINE

## 2020-06-26 PROCEDURE — 99214 OFFICE O/P EST MOD 30 MIN: CPT | Performed by: FAMILY MEDICINE

## 2020-06-26 RX ORDER — CYANOCOBALAMIN 1000 UG/ML
1000 INJECTION INTRAMUSCULAR; SUBCUTANEOUS ONCE
Status: COMPLETED | OUTPATIENT
Start: 2020-06-26 | End: 2020-06-26

## 2020-06-26 RX ADMIN — CYANOCOBALAMIN 1000 MCG: 1000 INJECTION INTRAMUSCULAR; SUBCUTANEOUS at 09:46

## 2020-06-26 ASSESSMENT — PATIENT HEALTH QUESTIONNAIRE - PHQ9
SUM OF ALL RESPONSES TO PHQ9 QUESTIONS 1 & 2: 0
SUM OF ALL RESPONSES TO PHQ QUESTIONS 1-9: 0
1. LITTLE INTEREST OR PLEASURE IN DOING THINGS: 0
2. FEELING DOWN, DEPRESSED OR HOPELESS: 0
SUM OF ALL RESPONSES TO PHQ QUESTIONS 1-9: 0

## 2020-06-27 LAB
ESTIMATED AVERAGE GLUCOSE: 191.5 MG/DL
HBA1C MFR BLD: 8.3 %

## 2020-06-30 ENCOUNTER — HOSPITAL ENCOUNTER (OUTPATIENT)
Age: 85
Discharge: HOME | End: 2020-06-30
Payer: MEDICARE

## 2020-06-30 ENCOUNTER — HOSPITAL ENCOUNTER (OUTPATIENT)
Dept: GENERAL RADIOLOGY | Age: 85
Discharge: HOME | End: 2020-06-30
Payer: MEDICARE

## 2020-06-30 LAB — METHYLMALONIC ACID: 0.32 UMOL/L (ref 0–0.4)

## 2020-06-30 PROCEDURE — 71046 X-RAY EXAM CHEST 2 VIEWS: CPT

## 2020-07-21 RX ORDER — GLIMEPIRIDE 2 MG/1
TABLET ORAL
Qty: 180 TABLET | Refills: 0 | Status: SHIPPED | OUTPATIENT
Start: 2020-07-21 | End: 2020-10-26

## 2020-07-24 ENCOUNTER — NURSE ONLY (OUTPATIENT)
Dept: FAMILY MEDICINE CLINIC | Age: 85
End: 2020-07-24
Payer: MEDICARE

## 2020-07-24 PROCEDURE — 96372 THER/PROPH/DIAG INJ SC/IM: CPT | Performed by: FAMILY MEDICINE

## 2020-07-24 RX ORDER — CYANOCOBALAMIN 1000 UG/ML
1000 INJECTION INTRAMUSCULAR; SUBCUTANEOUS ONCE
Status: COMPLETED | OUTPATIENT
Start: 2020-07-24 | End: 2020-07-24

## 2020-07-24 RX ORDER — BLOOD-GLUCOSE METER
EACH MISCELLANEOUS
Qty: 1 KIT | Refills: 0 | Status: SHIPPED | OUTPATIENT
Start: 2020-07-24

## 2020-07-24 RX ORDER — BLOOD-GLUCOSE METER
EACH MISCELLANEOUS
Qty: 1 KIT | Refills: 0 | Status: SHIPPED | OUTPATIENT
Start: 2020-07-24 | End: 2020-07-24 | Stop reason: SDUPTHER

## 2020-07-24 RX ADMIN — CYANOCOBALAMIN 1000 MCG: 1000 INJECTION INTRAMUSCULAR; SUBCUTANEOUS at 14:00

## 2020-07-24 NOTE — PROGRESS NOTES
Vitamin B 12 injection given in right deltoid IM  Lot # [id number]  Exp 7/2021  [facility] # [id number]

## 2020-07-30 RX ORDER — ALENDRONATE SODIUM 70 MG/1
TABLET ORAL
Qty: 12 TABLET | Refills: 0 | Status: SHIPPED | OUTPATIENT
Start: 2020-07-30 | End: 2020-10-05

## 2020-07-30 NOTE — TELEPHONE ENCOUNTER
Refilled medication per verbal order from provider.   Future appt scheduled 09/28/2020  Last appt 06/26/2020

## 2020-08-07 ENCOUNTER — TELEPHONE (OUTPATIENT)
Dept: FAMILY MEDICINE CLINIC | Age: 85
End: 2020-08-07

## 2020-08-07 NOTE — TELEPHONE ENCOUNTER
----- Message from Adriana Perrin sent at 8/3/2020  1:09 PM EDT -----  Subject: Message to Provider    QUESTIONS  Information for Provider? Dayanna Guzman stated that [facility] sent a script for a   blood glucose monitor but the facility does not have the correct 4 digit   code to process it through the patients Medicare insurance. Please call   [facility] with the code.  ---------------------------------------------------------------------------  --------------  [address]  What is the best way for the office to contact you? OK to leave message on   voicemail  Preferred Call Back Phone Number? [phone]  ---------------------------------------------------------------------------  --------------  SCRIPT ANSWERS  Relationship to Patient? Other  Representative Name? Dayanna Guzman  Is the Representative on the appropriate HIPAA document in Epic?  Yes

## 2020-08-10 RX ORDER — SIMVASTATIN 20 MG
TABLET ORAL
Qty: 90 TABLET | Refills: 0 | Status: SHIPPED | OUTPATIENT
Start: 2020-08-10 | End: 2020-11-02

## 2020-08-24 ENCOUNTER — NURSE ONLY (OUTPATIENT)
Dept: FAMILY MEDICINE CLINIC | Age: 85
End: 2020-08-24
Payer: MEDICARE

## 2020-08-24 ENCOUNTER — TELEPHONE (OUTPATIENT)
Dept: FAMILY MEDICINE CLINIC | Age: 85
End: 2020-08-24

## 2020-08-24 PROCEDURE — 96372 THER/PROPH/DIAG INJ SC/IM: CPT | Performed by: NURSE PRACTITIONER

## 2020-08-24 RX ORDER — CYANOCOBALAMIN 1000 UG/ML
1000 INJECTION INTRAMUSCULAR; SUBCUTANEOUS ONCE
Status: COMPLETED | OUTPATIENT
Start: 2020-08-24 | End: 2020-08-24

## 2020-08-24 RX ADMIN — CYANOCOBALAMIN 1000 MCG: 1000 INJECTION INTRAMUSCULAR; SUBCUTANEOUS at 11:16

## 2020-09-23 RX ORDER — LISINOPRIL 20 MG/1
TABLET ORAL
Qty: 45 TABLET | Refills: 4 | Status: SHIPPED | OUTPATIENT
Start: 2020-09-23 | End: 2021-01-01

## 2020-09-23 RX ORDER — ATENOLOL 25 MG/1
TABLET ORAL
Qty: 90 TABLET | Refills: 0 | Status: SHIPPED | OUTPATIENT
Start: 2020-09-23 | End: 2020-12-30

## 2020-09-28 ENCOUNTER — OFFICE VISIT (OUTPATIENT)
Dept: FAMILY MEDICINE CLINIC | Age: 85
End: 2020-09-28
Payer: MEDICARE

## 2020-09-28 VITALS
BODY MASS INDEX: 27.73 KG/M2 | OXYGEN SATURATION: 100 % | DIASTOLIC BLOOD PRESSURE: 74 MMHG | TEMPERATURE: 98.1 F | SYSTOLIC BLOOD PRESSURE: 126 MMHG | HEART RATE: 81 BPM | WEIGHT: 171.8 LBS

## 2020-09-28 LAB — HBA1C MFR BLD: 8.2 %

## 2020-09-28 PROCEDURE — 83036 HEMOGLOBIN GLYCOSYLATED A1C: CPT | Performed by: NURSE PRACTITIONER

## 2020-09-28 PROCEDURE — 99215 OFFICE O/P EST HI 40 MIN: CPT | Performed by: NURSE PRACTITIONER

## 2020-09-28 PROCEDURE — 3052F HG A1C>EQUAL 8.0%<EQUAL 9.0%: CPT | Performed by: NURSE PRACTITIONER

## 2020-09-28 PROCEDURE — 96372 THER/PROPH/DIAG INJ SC/IM: CPT | Performed by: NURSE PRACTITIONER

## 2020-09-28 RX ORDER — CYANOCOBALAMIN 1000 UG/ML
1000 INJECTION INTRAMUSCULAR; SUBCUTANEOUS ONCE
Status: COMPLETED | OUTPATIENT
Start: 2020-09-28 | End: 2020-09-28

## 2020-09-28 RX ADMIN — CYANOCOBALAMIN 1000 MCG: 1000 INJECTION INTRAMUSCULAR; SUBCUTANEOUS at 09:41

## 2020-09-28 ASSESSMENT — ENCOUNTER SYMPTOMS
ABDOMINAL PAIN: 0
COUGH: 0
CHEST TIGHTNESS: 0
DIARRHEA: 0
VOMITING: 0
SHORTNESS OF BREATH: 0
SORE THROAT: 0
RHINORRHEA: 0
NAUSEA: 0
WHEEZING: 0

## 2020-09-28 NOTE — PROGRESS NOTES
children: Not on file    Years of education: Not on file    Highest education level: Not on file   Occupational History    Not on file   Social Needs    Financial resource strain: Not on file    Food insecurity     Worry: Not on file     Inability: Not on file    Transportation needs     Medical: Not on file     Non-medical: Not on file   Tobacco Use    Smoking status: Never Smoker    Smokeless tobacco: Never Used   Substance and Sexual Activity    Alcohol use: No     Alcohol/week: 0.0 standard drinks    Drug use: Not on file    Sexual activity: Not on file   Lifestyle    Physical activity     Days per week: Not on file     Minutes per session: Not on file    Stress: Not on file   Relationships    Social connections     Talks on phone: Not on file     Gets together: Not on file     Attends Orthodox service: Not on file     Active member of club or organization: Not on file     Attends meetings of clubs or organizations: Not on file     Relationship status: Not on file    Intimate partner violence     Fear of current or ex partner: Not on file     Emotionally abused: Not on file     Physically abused: Not on file     Forced sexual activity: Not on file   Other Topics Concern    Not on file   Social History Narrative    Not on file     Current Outpatient Medications   Medication Sig Dispense Refill    lisinopril (PRINIVIL;ZESTRIL) 20 MG tablet TAKE ONE AND ONE-HALF TABLET BY MOUTH DAILY 45 tablet 4    atenolol (TENORMIN) 25 MG tablet TAKE ONE TABLET BY MOUTH DAILY 90 tablet 0    simvastatin (ZOCOR) 20 MG tablet TAKE ONE TABLET BY MOUTH ONCE NIGHTLY 90 tablet 0    alendronate (FOSAMAX) 70 MG tablet TAKE 1 TABLET BY MOUTH ONCE WEEKLY BEFORE BREAKFAST, ON AN EMPTY STOMACH: REMAIN UPRIGHT FOR 30 MINUTES:TAKE WITH 8 OUNCES OF WATER 12 tablet 0    Blood Glucose Monitoring Suppl (ONE TOUCH ULTRA 2) w/Device KIT Test once daily Dx: E11.9 1 kit 0    glimepiride (AMARYL) 2 MG tablet TAKE ONE TABLET BY MOUTH TWICE A  tablet 0    blood glucose test strips (ASCENSIA AUTODISC VI;ONE TOUCH ULTRA TEST VI) strip 1 each by In Vitro route daily As needed. 100 each 5    pioglitazone (ACTOS) 30 MG tablet TAKE ONE TABLET BY MOUTH DAILY 90 tablet 1    metFORMIN (GLUCOPHAGE) 1000 MG tablet TAKE ONE TABLET BY MOUTH TWICE A DAY WITH MEALS 180 tablet 1    conjugated estrogens (PREMARIN) 0.625 MG/GM vaginal cream Place around urethra daily 1 Tube 5    albuterol sulfate  (90 Base) MCG/ACT inhaler Inhale 2 puffs into the lungs 4 times daily as needed for Wheezing 1 Inhaler 1    Melatonin 5 MG CAPS Take 5 mg by mouth nightly as needed (sleep) 1 capsule 0    sitaGLIPtin (JANUVIA) 100 MG tablet Take 100 mg by mouth daily       No current facility-administered medications for this visit. Allergies   Allergen Reactions    Morphine Nausea And Vomiting       REVIEW OF SYSTEMS  Review of Systems   Constitutional: Negative for activity change, appetite change, chills and fever. HENT: Negative for congestion, rhinorrhea and sore throat. Eyes: Negative for visual disturbance. Respiratory: Negative for cough, chest tightness, shortness of breath and wheezing. Cardiovascular: Negative for chest pain and leg swelling. Gastrointestinal: Negative for abdominal pain, diarrhea, nausea and vomiting. Genitourinary: Negative for dysuria, frequency, hematuria and urgency. Musculoskeletal: Negative for gait problem and myalgias. Skin: Negative for rash. Neurological: Negative for dizziness, weakness, light-headedness, numbness and headaches. Psychiatric/Behavioral: Negative for sleep disturbance. PHYSICAL EXAM  /74   Pulse 81   Temp 98.1 °F (36.7 °C) (Oral)   Wt 171 lb 12.8 oz (77.9 kg)   SpO2 100%   BMI 27.73 kg/m²   Physical Exam  Vitals signs reviewed. Constitutional:       General: She is not in acute distress. Appearance: Normal appearance. She is well-developed.  She is not diaphoretic. HENT:      Head: Normocephalic and atraumatic. Right Ear: Tympanic membrane normal.      Left Ear: Tympanic membrane normal.      Ears:      Comments: Cerumen noted bilaterally, not impacted. Nose: Nose normal.      Mouth/Throat:      Mouth: Mucous membranes are moist.      Pharynx: Oropharynx is clear. No oropharyngeal exudate or posterior oropharyngeal erythema. Eyes:      General:         Right eye: No discharge. Left eye: No discharge. Pupils: Pupils are equal, round, and reactive to light. Neck:      Musculoskeletal: Normal range of motion and neck supple. Vascular: No JVD. Cardiovascular:      Rate and Rhythm: Normal rate. Pulses: Normal pulses. Pulmonary:      Effort: Pulmonary effort is normal. No respiratory distress. Breath sounds: No stridor. No wheezing, rhonchi or rales. Chest:      Chest wall: No tenderness. Abdominal:      General: Bowel sounds are normal. There is no distension. Palpations: Abdomen is soft. Tenderness: There is no abdominal tenderness. There is no guarding. Musculoskeletal: Normal range of motion. General: No swelling or deformity. Feet:      Comments: Monofilament test normal.  Skin:     General: Skin is warm and dry. Capillary Refill: Capillary refill takes less than 2 seconds. Coloration: Skin is not pale. Findings: No bruising, lesion or rash. Neurological:      General: No focal deficit present. Mental Status: She is alert and oriented to person, place, and time. Psychiatric:         Mood and Affect: Mood normal.         Behavior: Behavior normal.         ASSESSMENT/PLAN:   1. Type 2 diabetes mellitus without complication, without long-term current use of insulin (HCC)  Stable versus uncontrolled. Previous A1c 8.3, today's is 8.2. She reports checking her blood sugar daily.   Patient reports that readings are \"normal .\"  Patient denies any episodes of hyperglycemia or hypoglycemia. Patient reports that she does not always watch what she eats. I did discuss monitoring carbohydrates and sugar intake to have better control of diabetes mellitus. Risks discussed in detail with patient regarding uncontrolled diabetes. Patient encouraged to monitor diet and will recheck at next visit if numbers continue to be elevated she will need to have additional medications. Patient currently takes glimepiride 2mg twice a day, Actos 30 mg daily, Margot 1000 mg twice a day, no via 100 mg daily. Continue current treatment management follow-up in 3 months, sooner for new or worsening symptoms. Patient reports that her granddaughter is scheduling her diabetic eye exam soon. I did recommend patient having results sent to our office. Patient acknowledges. - [facility] DIABETES FOOT EXAM  - POCT glycosylated hemoglobin (Hb A1C)    2. Benign essential hypertension  Controlled. Patient compliant with atenolol 25 mg daily, lisinopril 30 mg daily for management. Patient denies any adverse side effects or missed doses. No episodes of dizziness, lightheadedness, chest pain, tightness, palpitations or shortness of breath. Continue current treatment management follow-up in 3 months, sooner for new or worsening symptoms. 3. Pure hypercholesterolemia  Stable. Previous lipid normal.  Patient compliant with simvastatin 40 mg daily. No adverse side effects or missed doses. Patient did advised on dietary habits and to avoid saturated fats. Continue current treatment management follow-up in 6 months, sooner for new or worsening symptoms. 4. B12 deficiency  Stable. Previous labs normal.  Vitamin B12 injections given, patient due today. Continue current treatment management follow-up in 6 months, sooner for new or worsening symptoms. - cyanocobalamin injection 1,000 mcg    5. Psychophysiologic insomnia  Stable.   Patient reports that she is able to fall asleep easily but sometimes wakes up throughout the night. Patient reports that she does take melatonin which helps. Continue current treatment and management follow-up in 6 months, sooner for new or worsening symptoms. 6. Vitamin D deficiency disease  Stable. 7. Ear itching  Patient reports itching in bilateral ears. No pain or drainage. Patient reports that she does wear hearing aids. Upon exam slight cerumen noted bilaterally however it is not impacted. No evidence of erythema or drainage otherwise. I did recommend patient buy over-the-counter Debrox eardrops to help with wax removal.  Patient verbalized and acknowledges. I did recommend if symptoms do not improve patient to follow-up sooner. The note was completed using Dragon voice recognition transcription. Every effort was made to ensure accuracy; however, inadvertent  transcription errors may be present despite my best efforts to edit errors.     LUIS Russ - CNP

## 2020-09-28 NOTE — PATIENT INSTRUCTIONS
Please read the healthy family handout that you were given and share it with your family. Please compare this printed medication list with your medications at home to be sure they are the same. If you have any medications that are different please contact us immediately at [phone]. Also review your allergies that we have listed, these may also include medications that you have not been able to tolerate, make sure everything listed is correct. If you have any allergies that are different please contact us immediately at [phone]. Patient Education        Learning About Meal Planning for Diabetes  Why plan your meals? Meal planning can be a key part of managing diabetes. Planning meals and snacks with the right balance of carbohydrate, protein, and fat can help you keep your blood sugar at the target level you set with your doctor. You don't have to eat special foods. You can eat what your family eats, including sweets once in a while. But you do have to pay attention to how often you eat and how much you eat of certain foods. You may want to work with a dietitian or a certified diabetes educator. He or she can give you tips and meal ideas and can answer your questions about meal planning. This health professional can also help you reach a healthy weight if that is one of your goals. What plan is right for you? Your dietitian or diabetes educator may suggest that you start with the plate format or carbohydrate counting. The plate format  The plate format is a simple way to help you manage how you eat. You plan meals by learning how much space each food should take on a plate. Using the plate format helps you spread carbohydrate throughout the day. It can make it easier to keep your blood sugar level within your target range. It also helps you see if you're eating healthy portion sizes. To use the plate format, you put non-starchy vegetables on half your plate.  Add meat or meat substitutes on that, you'll need to adjust the other numbers. Total carbohydrate is the next thing you need to look for on the label. If you count carbohydrate servings, one serving of carbohydrate is 15 grams. For foods that don't come with labels, such as fresh fruits and vegetables, you'll need a guide that lists carbohydrate in these foods. Ask your doctor, dietitian, or diabetes educator about books or other nutrition guides you can use. If you take insulin, you need to know how many grams of carbohydrate are in a meal. This lets you know how much rapid-acting insulin to take before you eat. If you use an insulin pump, you get a constant rate of insulin during the day. So the pump must be programmed at meals to give you extra insulin to cover the rise in blood sugar after meals. When you know how much carbohydrate you will eat, you can take the right amount of insulin. Or, if you always use the same amount of insulin, you need to make sure that you eat the same amount of carbohydrate at meals. If you need more help to understand carbohydrate counting and food labels, ask your doctor, dietitian, or diabetes educator. How do you get started with meal planning? Here are some tips to get started:  · Plan your meals a week at a time. Don't forget to include snacks too. · Use cookbooks or online recipes to plan several main meals. Plan some quick meals for busy nights. You also can double some recipes that freeze well. Then you can save half for other busy nights when you don't have time to cook. · Make sure you have the ingredients you need for your recipes. If you're running low on basic items, put these items on your shopping list too. · List foods that you use to make breakfasts, lunches, and snacks. List plenty of fruits and vegetables. · Post this list on the refrigerator. Add to it as you think of more things you need. · Take the list to the store to do your weekly shopping.   Follow-up care is a key part of your treatment and safety. Be sure to make and go to all appointments, and call your doctor if you are having problems. It's also a good idea to know your test results and keep a list of the medicines you take. Where can you learn more? Go to [email or web address] org and sign in to your First[employer] account. Enter G971 in the Transmit box to learn more about \"Learning About Meal Planning for Diabetes. \"     If you do not have an account, please click on the \"Sign Up Now\" link. Current as of: December 20, 2019               Content Version: 12.5  © 5794-2151 [employer]. Care instructions adapted under license by [facility] ([facility]). If you have questions about a medical condition or this instruction, always ask your healthcare professional. [address] any warranty or liability for your use of this information. Patient Education        High Blood Pressure: Care Instructions  Overview     It's normal for blood pressure to go up and down throughout the day. But if it stays up, you have high blood pressure. Another name for high blood pressure is hypertension. Despite what a lot of people think, high blood pressure usually doesn't cause headaches or make you feel dizzy or lightheaded. It usually has no symptoms. But it does increase your risk of stroke, heart attack, and other problems. You and your doctor will talk about your risks of these problems based on your blood pressure. Your doctor will give you a goal for your blood pressure. Your goal will be based on your health and your age. Lifestyle changes, such as eating healthy and being active, are always important to help lower blood pressure. You might also take medicine to reach your blood pressure goal.  Follow-up care is a key part of your treatment and safety. Be sure to make and go to all appointments, and call your doctor if you are having problems.  It's also a good idea to know your test results and keep a list of the medicines you take. How can you care for yourself at home? Medical treatment  · If you stop taking your medicine, your blood pressure will go back up. You may take one or more types of medicine to lower your blood pressure. Be safe with medicines. Take your medicine exactly as prescribed. Call your doctor if you think you are having a problem with your medicine. · Talk to your doctor before you start taking aspirin every day. Aspirin can help certain people lower their risk of a heart attack or stroke. But taking aspirin isn't right for everyone, because it can cause serious bleeding. · See your doctor regularly. You may need to see the doctor more often at first or until your blood pressure comes down. · If you are taking blood pressure medicine, talk to your doctor before you take decongestants or anti-inflammatory medicine, such as ibuprofen. Some of these medicines can raise blood pressure. · Learn how to check your blood pressure at home. Lifestyle changes  · Stay at a healthy weight. This is especially important if you put on weight around the waist. Losing even 10 pounds can help you lower your blood pressure. · If your doctor recommends it, get more exercise. Walking is a good choice. Bit by bit, increase the amount you walk every day. Try for at least 30 minutes on most days of the week. You also may want to swim, bike, or do other activities. · Avoid or limit alcohol. Talk to your doctor about whether you can drink any alcohol. · Try to limit how much sodium you eat to less than 2,300 milligrams (mg) a day. Your doctor may ask you to try to eat less than 1,500 mg a day. · Eat plenty of fruits (such as bananas and oranges), vegetables, legumes, whole grains, and low-fat dairy products. · Lower the amount of saturated fat in your diet. Saturated fat is found in animal products such as milk, cheese, and meat.  Limiting these foods may help you lose weight and also lower your risk for heart disease. · Do not smoke. Smoking increases your risk for heart attack and stroke. If you need help quitting, talk to your doctor about stop-smoking programs and medicines. These can increase your chances of quitting for good. When should you call for help? Call  911 anytime you think you may need emergency care. This may mean having symptoms that suggest that your blood pressure is causing a serious heart or blood vessel problem. Your blood pressure may be over 180/120. For example, call 911 if:  · You have symptoms of a heart attack. These may include:  ? Chest pain or pressure, or a strange feeling in the chest.  ? Sweating. ? Shortness of breath. ? Nausea or vomiting. ? Pain, pressure, or a strange feeling in the back, neck, jaw, or upper belly or in one or both shoulders or arms. ? Lightheadedness or sudden weakness. ? A fast or irregular heartbeat. · You have symptoms of a stroke. These may include:  ? Sudden numbness, tingling, weakness, or loss of movement in your face, arm, or leg, especially on only one side of your body. ? Sudden vision changes. ? Sudden trouble speaking. ? Sudden confusion or trouble understanding simple statements. ? Sudden problems with walking or balance. ? A sudden, severe headache that is different from past headaches. · You have severe back or belly pain. Do not wait until your blood pressure comes down on its own. Get help right away. Call your doctor now or seek immediate care if:  · Your blood pressure is much higher than normal (such as 180/120 or higher), but you don't have symptoms. · You think high blood pressure is causing symptoms, such as:  ? Severe headache.  ? Blurry vision. Watch closely for changes in your health, and be sure to contact your doctor if:  · Your blood pressure measures higher than your doctor recommends at least 2 times. That means the top number is higher or the bottom number is higher, or both.   · You think you may be having side effects from your blood pressure medicine. Where can you learn more? Go to [email or web address] org and sign in to your Broadlink account. Enter W759 in the S[employer] box to learn more about \"High Blood Pressure: Care Instructions. \"     If you do not have an account, please click on the \"Sign Up Now\" link. Current as of: December 16, 2019               Content Version: 12.5  © 1427-6720 [employer]. Care instructions adapted under license by [facility] ([facility]). If you have questions about a medical condition or this instruction, always ask your healthcare professional. [address] any warranty or liability for your use of this information. Patient Education        High Cholesterol: Care Instructions  Your Care Instructions     Cholesterol is a type of fat in your blood. It is needed for many body functions, such as making new cells. Cholesterol is made by your body. It also comes from food you eat. High cholesterol means that you have too much of the fat in your blood. This raises your risk of a heart attack and stroke. LDL and HDL are part of your total cholesterol. LDL is the \"bad\" cholesterol. High LDL can raise your risk for heart disease, heart attack, and stroke. HDL is the \"good\" cholesterol. It helps clear bad cholesterol from the body. High HDL is linked with a lower risk of heart disease, heart attack, and stroke. Your cholesterol levels help your doctor find out your risk for having a heart attack or stroke. You and your doctor can talk about whether you need to lower your risk and what treatment is best for you. A heart-healthy lifestyle along with medicines can help lower your cholesterol and your risk. The way you choose to lower your risk will depend on how high your risk is for heart attack and stroke. It will also depend on how you feel about taking medicines.   Follow-up care is a key part of your questions about a medical condition or this instruction, always ask your healthcare professional. [place] [address] any warranty or liability for your use of this information.

## 2020-10-05 RX ORDER — ALENDRONATE SODIUM 70 MG/1
TABLET ORAL
Qty: 12 TABLET | Refills: 0 | Status: SHIPPED | OUTPATIENT
Start: 2020-10-05 | End: 2020-12-30

## 2020-10-26 ENCOUNTER — NURSE ONLY (OUTPATIENT)
Dept: FAMILY MEDICINE CLINIC | Age: 85
End: 2020-10-26
Payer: MEDICARE

## 2020-10-26 PROCEDURE — 96372 THER/PROPH/DIAG INJ SC/IM: CPT | Performed by: NURSE PRACTITIONER

## 2020-10-26 RX ORDER — CYANOCOBALAMIN 1000 UG/ML
1000 INJECTION INTRAMUSCULAR; SUBCUTANEOUS ONCE
Status: COMPLETED | OUTPATIENT
Start: 2020-10-26 | End: 2020-10-26

## 2020-10-26 RX ORDER — GLIMEPIRIDE 2 MG/1
TABLET ORAL
Qty: 180 TABLET | Refills: 2 | Status: SHIPPED | OUTPATIENT
Start: 2020-10-26 | End: 2021-01-01

## 2020-10-26 RX ADMIN — CYANOCOBALAMIN 1000 MCG: 1000 INJECTION INTRAMUSCULAR; SUBCUTANEOUS at 08:56

## 2020-10-26 NOTE — PROGRESS NOTES
Vit B 12 injection given in right deltoid Im  Lot # [id number]  [facility] 8/2021  [id number]

## 2020-10-27 RX ORDER — PIOGLITAZONEHYDROCHLORIDE 30 MG/1
TABLET ORAL
Qty: 90 TABLET | Refills: 0 | Status: SHIPPED | OUTPATIENT
Start: 2020-10-27 | End: 2020-11-02

## 2020-11-02 RX ORDER — SIMVASTATIN 20 MG
TABLET ORAL
Qty: 90 TABLET | Refills: 1 | Status: SHIPPED | OUTPATIENT
Start: 2020-11-02 | End: 2021-01-01

## 2020-11-02 RX ORDER — PIOGLITAZONEHYDROCHLORIDE 30 MG/1
TABLET ORAL
Qty: 90 TABLET | Refills: 0 | Status: SHIPPED | OUTPATIENT
Start: 2020-11-02 | End: 2021-01-01

## 2020-11-23 ENCOUNTER — TELEPHONE (OUTPATIENT)
Dept: FAMILY MEDICINE CLINIC | Age: 85
End: 2020-11-23

## 2020-11-23 ENCOUNTER — NURSE ONLY (OUTPATIENT)
Dept: FAMILY MEDICINE CLINIC | Age: 85
End: 2020-11-23
Payer: MEDICARE

## 2020-11-23 NOTE — TELEPHONE ENCOUNTER
Daughter was just wanting to see if she could do oral instead of the injections to keep her from coming in the office so much right now due to covid

## 2020-11-24 PROCEDURE — 96372 THER/PROPH/DIAG INJ SC/IM: CPT | Performed by: NURSE PRACTITIONER

## 2020-11-24 RX ORDER — CYANOCOBALAMIN 1000 UG/ML
1000 INJECTION INTRAMUSCULAR; SUBCUTANEOUS ONCE
Status: COMPLETED | OUTPATIENT
Start: 2020-11-24 | End: 2020-11-24

## 2020-11-24 RX ADMIN — CYANOCOBALAMIN 1000 MCG: 1000 INJECTION INTRAMUSCULAR; SUBCUTANEOUS at 14:38

## 2020-12-04 ENCOUNTER — TELEPHONE (OUTPATIENT)
Dept: FAMILY MEDICINE CLINIC | Age: 85
End: 2020-12-04

## 2020-12-04 RX ORDER — CEPHALEXIN 500 MG/1
500 CAPSULE ORAL 2 TIMES DAILY
Qty: 14 CAPSULE | Refills: 0 | Status: SHIPPED | OUTPATIENT
Start: 2020-12-04 | End: 2020-12-11

## 2020-12-04 NOTE — TELEPHONE ENCOUNTER
Patient states chest is red and blotchy with itching. Patient had a shingles vaccine about 2 weeks ago. She also fell overnight with no injuries noted. No fever. She is waiting on covid results.

## 2020-12-04 NOTE — TELEPHONE ENCOUNTER
Okay to send prescription for UTI-like symptoms to patient's pharmacy. Please notify patient's caregiver that prescription for Keflex 500 mg twice daily x7 days call for any new or concerning symptoms.

## 2020-12-04 NOTE — TELEPHONE ENCOUNTER
Did she hit her chest or head when she fell? Any new lotions or detergents that may have been irritating to her skin? Any other symptoms other than skin is red and itching? Has patient tried any hydrocortisone or antiaging creams to area?

## 2020-12-04 NOTE — TELEPHONE ENCOUNTER
Was she confused and thats why she fell? Unsteady on her feet? Tripped over something then fell? Or is that a common symptom with her with UTIs?

## 2020-12-04 NOTE — TELEPHONE ENCOUNTER
As far as family knows she did not hit her head or chest. No new lotions or detergents. Has not tried hydrocortisone and other creams. THey check her urine at home and she is positive for leuckocytes. Can you call  Something in for a UTI?

## 2020-12-04 NOTE — TELEPHONE ENCOUNTER
I would recommend over-the-counter hydrocortisone cream to area of itching and redness. If symptoms do not improve to notify our office. Is patient experiencing any urinary urgency, frequency, retention or dysuria?

## 2020-12-05 ENCOUNTER — APPOINTMENT (OUTPATIENT)
Dept: CT IMAGING | Age: 85
End: 2020-12-05
Payer: MEDICARE

## 2020-12-05 ENCOUNTER — HOSPITAL ENCOUNTER (EMERGENCY)
Age: 85
Discharge: HOME | End: 2020-12-05
Attending: EMERGENCY MEDICINE
Payer: MEDICARE

## 2020-12-05 ENCOUNTER — APPOINTMENT (OUTPATIENT)
Dept: GENERAL RADIOLOGY | Age: 85
End: 2020-12-05
Payer: MEDICARE

## 2020-12-05 VITALS
SYSTOLIC BLOOD PRESSURE: 148 MMHG | BODY MASS INDEX: 27.32 KG/M2 | HEIGHT: 66 IN | TEMPERATURE: 99.2 F | WEIGHT: 170 LBS | RESPIRATION RATE: 19 BRPM | HEART RATE: 75 BPM | DIASTOLIC BLOOD PRESSURE: 90 MMHG | OXYGEN SATURATION: 93 %

## 2020-12-05 LAB
A/G RATIO: 1.1 (ref 1.1–2.2)
ALBUMIN SERPL-MCNC: 3.2 G/DL (ref 3.4–5)
ALP BLD-CCNC: 38 U/L (ref 40–129)
ALT SERPL-CCNC: 26 U/L (ref 10–40)
ANION GAP SERPL CALCULATED.3IONS-SCNC: 10 MMOL/L (ref 3–16)
AST SERPL-CCNC: 31 U/L (ref 15–37)
BASOPHILS ABSOLUTE: 0 K/UL (ref 0–0.2)
BASOPHILS RELATIVE PERCENT: 0.2 %
BILIRUB SERPL-MCNC: <0.2 MG/DL (ref 0–1)
BILIRUBIN URINE: NEGATIVE
BLOOD, URINE: ABNORMAL
BUN BLDV-MCNC: 16 MG/DL (ref 7–20)
CALCIUM SERPL-MCNC: 8.2 MG/DL (ref 8.3–10.6)
CHLORIDE BLD-SCNC: 97 MMOL/L (ref 99–110)
CLARITY: CLEAR
CO2: 23 MMOL/L (ref 21–32)
COLOR: YELLOW
CREAT SERPL-MCNC: 0.8 MG/DL (ref 0.6–1.2)
EOSINOPHILS ABSOLUTE: 0 K/UL (ref 0–0.6)
EOSINOPHILS RELATIVE PERCENT: 0.1 %
EPITHELIAL CELLS, UA: ABNORMAL /HPF (ref 0–5)
GFR AFRICAN AMERICAN: >60
GFR NON-AFRICAN AMERICAN: >60
GLOBULIN: 3 G/DL
GLUCOSE BLD-MCNC: 251 MG/DL (ref 70–99)
GLUCOSE URINE: 500 MG/DL
HCT VFR BLD CALC: 31 % (ref 36–48)
HEMOGLOBIN: 10.1 G/DL (ref 12–16)
KETONES, URINE: NEGATIVE MG/DL
LEUKOCYTE ESTERASE, URINE: NEGATIVE
LYMPHOCYTES ABSOLUTE: 0.3 K/UL (ref 1–5.1)
LYMPHOCYTES RELATIVE PERCENT: 7.3 %
MCH RBC QN AUTO: 30 PG (ref 26–34)
MCHC RBC AUTO-ENTMCNC: 32.7 G/DL (ref 31–36)
MCV RBC AUTO: 91.8 FL (ref 80–100)
MICROSCOPIC EXAMINATION: YES
MONOCYTES ABSOLUTE: 0.2 K/UL (ref 0–1.3)
MONOCYTES RELATIVE PERCENT: 3.6 %
NEUTROPHILS ABSOLUTE: 3.8 K/UL (ref 1.7–7.7)
NEUTROPHILS RELATIVE PERCENT: 88.8 %
NITRITE, URINE: NEGATIVE
PDW BLD-RTO: 15 % (ref 12.4–15.4)
PH UA: 6 (ref 5–8)
PLATELET # BLD: 156 K/UL (ref 135–450)
PMV BLD AUTO: 9.9 FL (ref 5–10.5)
POTASSIUM SERPL-SCNC: 3.8 MMOL/L (ref 3.5–5.1)
PROTEIN UA: 100 MG/DL
RBC # BLD: 3.38 M/UL (ref 4–5.2)
RBC UA: ABNORMAL /HPF (ref 0–4)
SODIUM BLD-SCNC: 130 MMOL/L (ref 136–145)
SPECIFIC GRAVITY UA: 1.02 (ref 1–1.03)
TOTAL PROTEIN: 6.2 G/DL (ref 6.4–8.2)
TROPONIN: <0.01 NG/ML
URINE REFLEX TO CULTURE: ABNORMAL
URINE TYPE: ABNORMAL
UROBILINOGEN, URINE: 0.2 E.U./DL
WBC # BLD: 4.3 K/UL (ref 4–11)
WBC UA: ABNORMAL /HPF (ref 0–5)

## 2020-12-05 PROCEDURE — 84484 ASSAY OF TROPONIN QUANT: CPT

## 2020-12-05 PROCEDURE — 99284 EMERGENCY DEPT VISIT MOD MDM: CPT

## 2020-12-05 PROCEDURE — 80053 COMPREHEN METABOLIC PANEL: CPT

## 2020-12-05 PROCEDURE — 70450 CT HEAD/BRAIN W/O DYE: CPT

## 2020-12-05 PROCEDURE — 71045 X-RAY EXAM CHEST 1 VIEW: CPT

## 2020-12-05 PROCEDURE — 81001 URINALYSIS AUTO W/SCOPE: CPT

## 2020-12-05 PROCEDURE — 85025 COMPLETE CBC W/AUTO DIFF WBC: CPT

## 2020-12-05 NOTE — ED NOTES
Bed: 11  Expected date:   Expected time:   Means of arrival:   Comments:  Judit Jernigan  12/05/20 0534 10-Rashawn-2017 02:28

## 2020-12-05 NOTE — ED PROVIDER NOTES
[address] ED  EMERGENCY DEPARTMENT ENCOUNTER      Pt Name: Nannette Mathis  MRN: [id number]  [place] 1935  Date of evaluation: 12/5/2020  Provider: Elias Hidalgo MD    CHIEF COMPLAINT       Chief Complaint   Patient presents with    Positive For Covid-19     Pt was tested for covid on Wed, Dec 2, symptoms started on 11/29, informed of results today. Pt is also being treated for a uti. Pt is weak and per ems, family keeps finding pt on the floor.  Fatigue         HISTORY OF PRESENT ILLNESS   (Location/Symptom, Timing/Onset, Context/Setting, Quality, Duration, Modifying Factors, Severity)  Note limiting factors. Nannette Mathis is a 82 y.o. female with past medical history of hypertension, hyperlipidemia, diabetes here today for concern of Covid    Patient presents emergency department today for COVID-19 infection. She reportedly have started having symptoms on 11/29. She has had intermittent cough with mild shortness of breath. Her family members were quarantining away from her as they had recently tested positive for Covid. Patient had a test on Wednesday, December 2 that came back today positive for COVID-19. The family notes she has significant fatigue and has difficulty getting around the house and frequently has to sit down to rest but then has difficulty getting up. She is had no overt fall. The patient herself states that other than feeling weak and tired she has no significant chest pain or shortness of breath at present. States she does not remember any fevers. Denies any vomiting or diarrhea. States she just feels tired. HPI    Nursing Notes were reviewed. REVIEW OF SYSTEMS    (2-9 systems for level 4, 10 or more for level 5)     Review of Systems    Please see HPI for pertinent positive and negative review of system findings. A full 10 system ROS was performed and otherwise negative.         PAST MEDICAL HISTORY     Past Medical History:   Diagnosis Date    Bacteremia 01/19/2019    E coli    [facility] mellitus ([facility]     Hyperlipidemia     Hypertension     [place] GI bleed 08/08/2017    [place]; biopsy nonspecific colitis    Subdural hematoma ([facility] 12/14    ?syncopal spell    Urinary tract infection 9/27/2019    Urinary tract infection, site not specified          SURGICAL HISTORY       Past Surgical History:   Procedure Laterality Date    CARPAL TUNNEL RELEASE  1991    CATARACT REMOVAL Bilateral     COLONOSCOPY  2007    normal per pt    COLONOSCOPY  08/31/2017    Colitis    [place] OPEN RX DISTAL RADIUS FX, INTRA-ARTICULAR, 3+ FRAG Right 11/8/2018    RIGHT DISTAL RADIUS OPEN REDUCTION INTERNAL FIXATION  -REGIONAL BLOCK- performed by Dc Starr MD at [address] Right     orif         CURRENT MEDICATIONS       Previous Medications    ALENDRONATE (FOSAMAX) 70 MG TABLET    TAKE 1 TABLET BY MOUTH ONCE WEEKLY ON AN EMPTY STOMACH BEFORE BREAKFAST. REMAIN UPRIGHT FOR 30 MINUTES & TAKE WITH 8 OUNCES OF WATER    ATENOLOL (TENORMIN) 25 MG TABLET    TAKE ONE TABLET BY MOUTH DAILY    BLOOD GLUCOSE MONITORING SUPPL (ONE TOUCH ULTRA 2) W/DEVICE KIT    Test once daily Dx: E11.9    BLOOD GLUCOSE TEST STRIPS (ASCENSIA AUTODISC VI;ONE TOUCH ULTRA TEST VI) STRIP    1 each by In Vitro route daily As needed.     CEPHALEXIN (KEFLEX) 500 MG CAPSULE    Take 1 capsule by mouth 2 times daily for 7 days    GLIMEPIRIDE (AMARYL) 2 MG TABLET    TAKE ONE TABLET BY MOUTH TWICE A DAY    LISINOPRIL (PRINIVIL;ZESTRIL) 20 MG TABLET    TAKE ONE AND ONE-HALF TABLET BY MOUTH DAILY    MELATONIN 5 MG CAPS    Take 5 mg by mouth nightly as needed (sleep)    METFORMIN (GLUCOPHAGE) 1000 MG TABLET    TAKE ONE TABLET BY MOUTH TWICE A DAY WITH MEALS    PIOGLITAZONE (ACTOS) 30 MG TABLET    TAKE ONE TABLET BY MOUTH DAILY    SIMVASTATIN (ZOCOR) 20 MG TABLET    TAKE ONE TABLET BY MOUTH ONCE NIGHTLY    SITAGLIPTIN (JANUVIA) 100 MG TABLET    Take 100 mg by mouth daily       ALLERGIES     Pcn [penicillins] and Morphine    FAMILY HISTORY       Family History   Problem Relation Age of Onset    High Blood Pressure Mother     Stroke Mother     Kidney Disease Father     High Blood Pressure Sister     Diabetes Brother     Heart Disease Brother           SOCIAL HISTORY       Social History     Socioeconomic History    Marital status:      Spouse name: None    Number of children: None    Years of education: None    Highest education level: None   Occupational History    None   Social Needs    Financial resource strain: None    Food insecurity     Worry: None     Inability: None    Transportation needs     Medical: None     Non-medical: None   Tobacco Use    Smoking status: Never Smoker    Smokeless tobacco: Never Used   Substance and Sexual Activity    Alcohol use: No     Alcohol/week: 0.0 standard drinks    Drug use: None    Sexual activity: None   Lifestyle    Physical activity     Days per week: None     Minutes per session: None    Stress: None   Relationships    Social connections     Talks on phone: None     Gets together: None     Attends Mosque service: None     Active member of club or organization: None     Attends meetings of clubs or organizations: None     Relationship status: None    Intimate partner violence     Fear of current or ex partner: None     Emotionally abused: None     Physically abused: None     Forced sexual activity: None   Other Topics Concern    None   Social History Narrative    None       SCREENINGS               PHYSICAL EXAM    (up to 7 for level 4, 8 or more for level 5)     ED Triage Vitals [12/05/20 1253]   BP Temp Temp src Pulse Resp SpO2 Height Weight   (!) 162/70 99.2 °F (37.3 °C) -- 69 24 96 % 5' 6\" (1.676 m) 170 lb (77.1 kg)       Physical Exam    General appearance:  Cooperative. No acute distress. Skin:  Warm. Dry. Eye:  Extraocular movements intact.      Ears, nose, mouth and throat:  Oral mucosa moist,  Neck:  Trachea midline. Heart:  Regular rate and rhythm  Perfusion:  intact  Respiratory:  Lungs clear to auscultation bilaterally. Respirations nonlabored. Abdominal:   Non distended. Nontender  Neurological:  Alert and oriented x 3.   Moves all extremities spontaneously  Musculoskeletal:   Normal ROM, no deformities          Psychiatric:  Normal mood      DIAGNOSTIC RESULTS       Labs Reviewed   CBC WITH AUTO DIFFERENTIAL - Abnormal; Notable for the following components:       Result Value    RBC 3.38 (*)     Hemoglobin 10.1 (*)     Hematocrit 31.0 (*)     Lymphocytes Absolute 0.3 (*)     All other components within normal limits    Narrative:     Performed at:  [facility],  [facility]   Phone [phone]   COMPREHENSIVE METABOLIC PANEL - Abnormal; Notable for the following components:    Sodium 130 (*)     Chloride 97 (*)     Glucose 251 (*)     Calcium 8.2 (*)     Total Protein 6.2 (*)     Alb 3.2 (*)     Alkaline Phosphatase 38 (*)     All other components within normal limits    Narrative:     Performed at:  [facility],  [facility]   Phone [phone]   URINE RT REFLEX TO CULTURE - Abnormal; Notable for the following components:    Glucose, Ur 500 (*)     Blood, Urine MODERATE (*)     Protein,  (*)     All other components within normal limits    Narrative:     Performed at:  [facility]) [facility],  [facility]   Phone [phone]   MICROSCOPIC URINALYSIS - Abnormal; Notable for the following components:    Epithelial Cells, UA 6-10 (*)     All other components within normal limits    Narrative:     Performed at:  [facility],  [employer] [employer]   Phone [phone]   TROPONIN    Narrative:     Performed at:  [facility]  [address],  [place], [facility]   Phone [phone]       Interpretation per the Radiologist below, if obtained/available at the time of this note:    CT Head WO Contrast   Final Result   No acute intracranial abnormality. XR CHEST PORTABLE   Final Result   New ill-defined bilateral pulmonary opacities could represent atypical   pneumonia             All other labs/imaging were within normal range or not returned as of this dictation. EMERGENCY DEPARTMENT COURSE and DIFFERENTIAL DIAGNOSIS/MDM:   Vitals:    Vitals:    12/05/20 1253   BP: (!) 162/70   Pulse: 69   Resp: 24   Temp: 99.2 °F (37.3 °C)   SpO2: 96%   Weight: 170 lb (77.1 kg)   Height: 5' 6\" (1.676 m)       Patient presents emergency department today with known COVID-19. Vital signs normal.  Resting comfortably. No chest pain. X-ray with infiltrates which is unsurprising given her known diagnosis. She has no oxygen requirement. She is able to transfer to use the restroom as needed. I discussed the case with the patient's family and they state that they have just started to recover from their recent COVID-19 infection and actually will be moving in with her mother. States she is otherwise at her mental baseline. She has no oxygen requirement. They are comfortable with bringing her home monitoring her they are watching her and attending to her needs while she recovers from Covid. I have encouraged him to obtain a pulse oximeter to check her oxygen level should she have any worsening shortness of breath.   She is to return for any hypoxia but otherwise I do feel that she can be discharged home    MDM    CONSULTS     None    Critical Care:   None    REASSESSMENT          PROCEDURE     Unless otherwise noted below, none     Procedures      FINAL IMPRESSION      1. COVID-19 virus infection            DISPOSITION/PLAN   DISPOSITION Decision To Discharge 12/05/2020 03:26:44 PM        PATIENT REFERRED TO:  LUIS Bronson - [address]  [phone]    Schedule an appointment as soon as possible for a visit         DISCHARGE MEDICATIONS:  New Prescriptions    No medications on file     Controlled Substances Monitoring:     RX Monitoring 10/29/2018   Attestation The Prescription Monitoring Report was requested today but not available. Periodic Controlled Substance Monitoring Possible medication side effects, risk of tolerance/dependence & alternative treatments discussed.        (Please note that portions of this note were completed with a voice recognition program.  Efforts were made to edit the dictations but occasionally words are mis-transcribed.)    Travis Tubbs MD (electronically signed)  Attending Emergency Physician            Gilford Picker, MD  12/05/20 3320

## 2020-12-05 NOTE — ED NOTES
Daughter name is Annmarie Salmeron and her phone number is [phone]     Cayden Peters  12/05/20 Gin

## 2020-12-07 ENCOUNTER — CARE COORDINATION (OUTPATIENT)
Dept: CARE COORDINATION | Age: 85
End: 2020-12-07

## 2020-12-07 NOTE — CARE COORDINATION
Call to patient as follow up from ER r/t risk for covid based on presenting sx and/ or edical hx.   Covid pos , tested 12/2    LM on VM with this ACM name and number to please call     Will continue outreach

## 2020-12-08 ENCOUNTER — CARE COORDINATION (OUTPATIENT)
Dept: CARE COORDINATION | Age: 85
End: 2020-12-08

## 2020-12-30 RX ORDER — ATENOLOL 25 MG/1
TABLET ORAL
Qty: 90 TABLET | Refills: 0 | Status: SHIPPED | OUTPATIENT
Start: 2020-12-30 | End: 2021-01-01

## 2020-12-30 RX ORDER — ALENDRONATE SODIUM 70 MG/1
TABLET ORAL
Qty: 12 TABLET | Refills: 0 | Status: SHIPPED | OUTPATIENT
Start: 2020-12-30 | End: 2021-01-01

## 2020-12-30 NOTE — TELEPHONE ENCOUNTER
Future appt scheduled 01/15/2021                  Last appt 09/28/2020      Last Written 10/05/2020    alendronate (FOSAMAX) 70 MG tablet  #12  0 RF       Refilled medication per verbal order from provider.

## 2020-12-30 NOTE — TELEPHONE ENCOUNTER
Future appt scheduled 01/15/2021                  Last appt 09/28/2020      Last Written 09/23/2020    atenolol (TENORMIN) 25 MG tablet  #90  0 RF         Refilled medication per verbal order from provider.

## 2021-01-01 ENCOUNTER — TELEPHONE (OUTPATIENT)
Dept: ORTHOPEDIC SURGERY | Age: 86
End: 2021-01-01

## 2021-01-01 ENCOUNTER — OFFICE VISIT (OUTPATIENT)
Dept: FAMILY MEDICINE CLINIC | Age: 86
End: 2021-01-01
Payer: MEDICARE

## 2021-01-01 ENCOUNTER — HOSPITAL ENCOUNTER (EMERGENCY)
Age: 86
Discharge: HOME | End: 2021-03-11
Payer: MEDICARE

## 2021-01-01 ENCOUNTER — NURSE ONLY (OUTPATIENT)
Dept: FAMILY MEDICINE CLINIC | Age: 86
End: 2021-01-01
Payer: MEDICARE

## 2021-01-01 ENCOUNTER — OFFICE VISIT (OUTPATIENT)
Dept: ORTHOPEDIC SURGERY | Age: 86
End: 2021-01-01
Payer: MEDICARE

## 2021-01-01 ENCOUNTER — TELEPHONE (OUTPATIENT)
Dept: FAMILY MEDICINE CLINIC | Age: 86
End: 2021-01-01

## 2021-01-01 ENCOUNTER — APPOINTMENT (OUTPATIENT)
Dept: GENERAL RADIOLOGY | Age: 86
End: 2021-01-01
Payer: MEDICARE

## 2021-01-01 ENCOUNTER — CARE COORDINATION (OUTPATIENT)
Dept: CARE COORDINATION | Age: 86
End: 2021-01-01

## 2021-01-01 VITALS
TEMPERATURE: 98.1 F | HEIGHT: 66 IN | HEART RATE: 62 BPM | SYSTOLIC BLOOD PRESSURE: 122 MMHG | DIASTOLIC BLOOD PRESSURE: 65 MMHG | OXYGEN SATURATION: 99 % | BODY MASS INDEX: 25.82 KG/M2

## 2021-01-01 VITALS
HEIGHT: 66 IN | HEART RATE: 64 BPM | BODY MASS INDEX: 25.71 KG/M2 | OXYGEN SATURATION: 99 % | RESPIRATION RATE: 16 BRPM | TEMPERATURE: 98 F | WEIGHT: 160 LBS | SYSTOLIC BLOOD PRESSURE: 113 MMHG | DIASTOLIC BLOOD PRESSURE: 58 MMHG

## 2021-01-01 VITALS
HEART RATE: 65 BPM | OXYGEN SATURATION: 96 % | DIASTOLIC BLOOD PRESSURE: 77 MMHG | WEIGHT: 159.5 LBS | BODY MASS INDEX: 25.74 KG/M2 | SYSTOLIC BLOOD PRESSURE: 133 MMHG

## 2021-01-01 VITALS
OXYGEN SATURATION: 97 % | DIASTOLIC BLOOD PRESSURE: 57 MMHG | WEIGHT: 162 LBS | SYSTOLIC BLOOD PRESSURE: 103 MMHG | HEART RATE: 67 BPM | TEMPERATURE: 99 F | BODY MASS INDEX: 26.15 KG/M2

## 2021-01-01 VITALS — BODY MASS INDEX: 25.71 KG/M2 | HEIGHT: 66 IN | WEIGHT: 160 LBS

## 2021-01-01 DIAGNOSIS — Z01.818 PRE-OP EXAM: ICD-10-CM

## 2021-01-01 DIAGNOSIS — I10 BENIGN ESSENTIAL HYPERTENSION: ICD-10-CM

## 2021-01-01 DIAGNOSIS — F51.04 PSYCHOPHYSIOLOGIC INSOMNIA: ICD-10-CM

## 2021-01-01 DIAGNOSIS — E53.8 B12 DEFICIENCY: ICD-10-CM

## 2021-01-01 DIAGNOSIS — N39.0 URINARY TRACT INFECTION WITHOUT HEMATURIA, SITE UNSPECIFIED: ICD-10-CM

## 2021-01-01 DIAGNOSIS — E11.9 TYPE 2 DIABETES MELLITUS WITHOUT COMPLICATION, WITHOUT LONG-TERM CURRENT USE OF INSULIN (HCC): ICD-10-CM

## 2021-01-01 DIAGNOSIS — E78.00 PURE HYPERCHOLESTEROLEMIA: ICD-10-CM

## 2021-01-01 DIAGNOSIS — E55.9 VITAMIN D DEFICIENCY DISEASE: ICD-10-CM

## 2021-01-01 DIAGNOSIS — E11.9 TYPE 2 DIABETES MELLITUS WITHOUT COMPLICATION, WITHOUT LONG-TERM CURRENT USE OF INSULIN (HCC): Primary | ICD-10-CM

## 2021-01-01 DIAGNOSIS — N18.30 STAGE 3 CHRONIC KIDNEY DISEASE, UNSPECIFIED WHETHER STAGE 3A OR 3B CKD (HCC): ICD-10-CM

## 2021-01-01 DIAGNOSIS — I50.9 CONGESTIVE HEART FAILURE, UNSPECIFIED HF CHRONICITY, UNSPECIFIED HEART FAILURE TYPE (HCC): ICD-10-CM

## 2021-01-01 DIAGNOSIS — N39.0 URINARY TRACT INFECTION WITHOUT HEMATURIA, SITE UNSPECIFIED: Primary | ICD-10-CM

## 2021-01-01 DIAGNOSIS — S82.892A CLOSED FRACTURE OF LEFT ANKLE, INITIAL ENCOUNTER: Primary | ICD-10-CM

## 2021-01-01 DIAGNOSIS — R41.0 CONFUSION: ICD-10-CM

## 2021-01-01 DIAGNOSIS — R41.0 CONFUSION: Primary | ICD-10-CM

## 2021-01-01 DIAGNOSIS — Z91.81 AT HIGH RISK FOR FALLS: Primary | ICD-10-CM

## 2021-01-01 DIAGNOSIS — S82.832A CLOSED FRACTURE OF DISTAL END OF LEFT FIBULA, UNSPECIFIED FRACTURE MORPHOLOGY, INITIAL ENCOUNTER: Primary | ICD-10-CM

## 2021-01-01 LAB
A/G RATIO: 1.3 (ref 1.1–2.2)
A/G RATIO: 1.6 (ref 1.1–2.2)
ALBUMIN SERPL-MCNC: 3.6 G/DL (ref 3.4–5)
ALBUMIN SERPL-MCNC: 4.1 G/DL (ref 3.4–5)
ALP BLD-CCNC: 36 U/L (ref 40–129)
ALP BLD-CCNC: 42 U/L (ref 40–129)
ALT SERPL-CCNC: 10 U/L (ref 10–40)
ALT SERPL-CCNC: 9 U/L (ref 10–40)
ANION GAP SERPL CALCULATED.3IONS-SCNC: 13 MMOL/L (ref 3–16)
ANION GAP SERPL CALCULATED.3IONS-SCNC: 8 MMOL/L (ref 3–16)
AST SERPL-CCNC: 11 U/L (ref 15–37)
AST SERPL-CCNC: 13 U/L (ref 15–37)
BASOPHILS ABSOLUTE: 0 K/UL (ref 0–0.2)
BASOPHILS RELATIVE PERCENT: 0.6 %
BILIRUB SERPL-MCNC: <0.2 MG/DL (ref 0–1)
BILIRUB SERPL-MCNC: <0.2 MG/DL (ref 0–1)
BILIRUBIN, POC: ABNORMAL
BILIRUBIN, POC: NORMAL
BLOOD URINE, POC: ABNORMAL
BLOOD URINE, POC: NORMAL
BUN BLDV-MCNC: 26 MG/DL (ref 7–20)
BUN BLDV-MCNC: 28 MG/DL (ref 7–20)
CALCIUM SERPL-MCNC: 9.7 MG/DL (ref 8.3–10.6)
CALCIUM SERPL-MCNC: 9.9 MG/DL (ref 8.3–10.6)
CHLORIDE BLD-SCNC: 101 MMOL/L (ref 99–110)
CHLORIDE BLD-SCNC: 99 MMOL/L (ref 99–110)
CHOLESTEROL, FASTING: 136 MG/DL (ref 0–199)
CLARITY, POC: CLEAR
CLARITY, POC: CLEAR
CO2: 26 MMOL/L (ref 21–32)
CO2: 26 MMOL/L (ref 21–32)
COLOR, POC: NORMAL
COLOR, POC: YELLOW
CREAT SERPL-MCNC: 1 MG/DL (ref 0.6–1.2)
CREAT SERPL-MCNC: 1.1 MG/DL (ref 0.6–1.2)
EKG ATRIAL RATE: 66 BPM
EKG DIAGNOSIS: NORMAL
EKG P AXIS: 54 DEGREES
EKG P-R INTERVAL: 204 MS
EKG Q-T INTERVAL: 380 MS
EKG QRS DURATION: 76 MS
EKG QTC CALCULATION (BAZETT): 398 MS
EKG R AXIS: 30 DEGREES
EKG T AXIS: 65 DEGREES
EKG VENTRICULAR RATE: 66 BPM
EOSINOPHILS ABSOLUTE: 0.2 K/UL (ref 0–0.6)
EOSINOPHILS RELATIVE PERCENT: 3.5 %
ESTIMATED AVERAGE GLUCOSE: 139.9 MG/DL
ESTIMATED AVERAGE GLUCOSE: 188.6 MG/DL
GFR AFRICAN AMERICAN: 57
GFR AFRICAN AMERICAN: >60
GFR NON-AFRICAN AMERICAN: 47
GFR NON-AFRICAN AMERICAN: 53
GLOBULIN: 2.6 G/DL
GLOBULIN: 2.7 G/DL
GLUCOSE BLD-MCNC: 120 MG/DL (ref 70–99)
GLUCOSE BLD-MCNC: 176 MG/DL (ref 70–99)
GLUCOSE URINE, POC: ABNORMAL
GLUCOSE URINE, POC: NORMAL
HBA1C MFR BLD: 6.5 %
HBA1C MFR BLD: 8 %
HBA1C MFR BLD: 8.2 %
HCT VFR BLD CALC: 32.4 % (ref 36–48)
HDLC SERPL-MCNC: 46 MG/DL (ref 40–60)
HEMOGLOBIN: 10.8 G/DL (ref 12–16)
KETONES, POC: ABNORMAL
KETONES, POC: NORMAL
LDL CHOLESTEROL CALCULATED: 71 MG/DL
LEUKOCYTE EST, POC: ABNORMAL
LEUKOCYTE EST, POC: NORMAL
LYMPHOCYTES ABSOLUTE: 0.5 K/UL (ref 1–5.1)
LYMPHOCYTES RELATIVE PERCENT: 10.1 %
MCH RBC QN AUTO: 30.7 PG (ref 26–34)
MCHC RBC AUTO-ENTMCNC: 33.3 G/DL (ref 31–36)
MCV RBC AUTO: 92.3 FL (ref 80–100)
METHYLMALONIC ACID: 0.16 UMOL/L (ref 0–0.4)
MONOCYTES ABSOLUTE: 0.3 K/UL (ref 0–1.3)
MONOCYTES RELATIVE PERCENT: 5.8 %
NEUTROPHILS ABSOLUTE: 3.7 K/UL (ref 1.7–7.7)
NEUTROPHILS RELATIVE PERCENT: 80 %
NITRITE, POC: NORMAL
NITRITE, POC: POSITIVE
ORGANISM: ABNORMAL
PDW BLD-RTO: 14.4 % (ref 12.4–15.4)
PH, POC: 5.5
PH, POC: 7
PLATELET # BLD: 222 K/UL (ref 135–450)
PMV BLD AUTO: 9.5 FL (ref 5–10.5)
POTASSIUM SERPL-SCNC: 4.7 MMOL/L (ref 3.5–5.1)
POTASSIUM SERPL-SCNC: 4.9 MMOL/L (ref 3.5–5.1)
PROTEIN, POC: ABNORMAL
PROTEIN, POC: NORMAL
RBC # BLD: 3.51 M/UL (ref 4–5.2)
SODIUM BLD-SCNC: 133 MMOL/L (ref 136–145)
SODIUM BLD-SCNC: 140 MMOL/L (ref 136–145)
SPECIFIC GRAVITY, POC: 1.01
SPECIFIC GRAVITY, POC: 1.01
TOTAL PROTEIN: 6.3 G/DL (ref 6.4–8.2)
TOTAL PROTEIN: 6.7 G/DL (ref 6.4–8.2)
TRIGLYCERIDE, FASTING: 96 MG/DL (ref 0–150)
URINE CULTURE, ROUTINE: ABNORMAL
UROBILINOGEN, POC: 0.2
UROBILINOGEN, POC: 0.2
VITAMIN D 25-HYDROXY: 50.1 NG/ML
VLDLC SERPL CALC-MCNC: 19 MG/DL
WBC # BLD: 4.6 K/UL (ref 4–11)

## 2021-01-01 PROCEDURE — 36415 COLL VENOUS BLD VENIPUNCTURE: CPT | Performed by: NURSE PRACTITIONER

## 2021-01-01 PROCEDURE — 3052F HG A1C>EQUAL 8.0%<EQUAL 9.0%: CPT | Performed by: NURSE PRACTITIONER

## 2021-01-01 PROCEDURE — 83036 HEMOGLOBIN GLYCOSYLATED A1C: CPT | Performed by: NURSE PRACTITIONER

## 2021-01-01 PROCEDURE — 99213 OFFICE O/P EST LOW 20 MIN: CPT | Performed by: NURSE PRACTITIONER

## 2021-01-01 PROCEDURE — 99283 EMERGENCY DEPT VISIT LOW MDM: CPT

## 2021-01-01 PROCEDURE — 93005 ELECTROCARDIOGRAM TRACING: CPT | Performed by: PHYSICIAN ASSISTANT

## 2021-01-01 PROCEDURE — 73610 X-RAY EXAM OF ANKLE: CPT

## 2021-01-01 PROCEDURE — 99204 OFFICE O/P NEW MOD 45 MIN: CPT | Performed by: ORTHOPAEDIC SURGERY

## 2021-01-01 PROCEDURE — 93010 ELECTROCARDIOGRAM REPORT: CPT | Performed by: INTERNAL MEDICINE

## 2021-01-01 PROCEDURE — 99214 OFFICE O/P EST MOD 30 MIN: CPT | Performed by: NURSE PRACTITIONER

## 2021-01-01 PROCEDURE — 81002 URINALYSIS NONAUTO W/O SCOPE: CPT | Performed by: NURSE PRACTITIONER

## 2021-01-01 PROCEDURE — 29515 APPLICATION SHORT LEG SPLINT: CPT

## 2021-01-01 PROCEDURE — 6370000000 HC RX 637 (ALT 250 FOR IP): Performed by: PHYSICIAN ASSISTANT

## 2021-01-01 RX ORDER — LISINOPRIL 10 MG/1
10 TABLET ORAL DAILY
Qty: 30 TABLET | Refills: 5 | Status: SHIPPED | OUTPATIENT
Start: 2021-01-01 | End: 2021-01-01 | Stop reason: SDUPTHER

## 2021-01-01 RX ORDER — METOPROLOL SUCCINATE 50 MG/1
TABLET, EXTENDED RELEASE ORAL
Qty: 30 TABLET | Refills: 5 | Status: SHIPPED | OUTPATIENT
Start: 2021-01-01 | End: 2021-01-01 | Stop reason: SDUPTHER

## 2021-01-01 RX ORDER — FUROSEMIDE 20 MG/1
20 TABLET ORAL DAILY
Qty: 30 TABLET | Refills: 5 | Status: SHIPPED | OUTPATIENT
Start: 2021-01-01 | End: 2021-01-01 | Stop reason: SDUPTHER

## 2021-01-01 RX ORDER — GLIMEPIRIDE 2 MG/1
TABLET ORAL
Qty: 180 TABLET | Refills: 1 | Status: SHIPPED | OUTPATIENT
Start: 2021-01-01 | End: 2022-01-01 | Stop reason: SDUPTHER

## 2021-01-01 RX ORDER — PEN NEEDLE, DIABETIC 31 GX5/16"
NEEDLE, DISPOSABLE MISCELLANEOUS
COMMUNITY
Start: 2021-01-21

## 2021-01-01 RX ORDER — LISINOPRIL 10 MG/1
10 TABLET ORAL DAILY
Qty: 90 TABLET | Refills: 1 | Status: SHIPPED | OUTPATIENT
Start: 2021-01-01

## 2021-01-01 RX ORDER — PIOGLITAZONEHYDROCHLORIDE 30 MG/1
TABLET ORAL
Qty: 90 TABLET | Refills: 1 | Status: SHIPPED | OUTPATIENT
Start: 2021-01-01 | End: 2021-01-01 | Stop reason: SDUPTHER

## 2021-01-01 RX ORDER — ALENDRONATE SODIUM 70 MG/1
TABLET ORAL
Qty: 12 TABLET | Refills: 1 | Status: SHIPPED | OUTPATIENT
Start: 2021-01-01 | End: 2021-01-01 | Stop reason: SDUPTHER

## 2021-01-01 RX ORDER — HYDROCODONE BITARTRATE AND ACETAMINOPHEN 5; 325 MG/1; MG/1
1 TABLET ORAL ONCE
Status: COMPLETED | OUTPATIENT
Start: 2021-01-01 | End: 2021-01-01

## 2021-01-01 RX ORDER — HYDROCODONE BITARTRATE AND ACETAMINOPHEN 5; 325 MG/1; MG/1
1 TABLET ORAL EVERY 6 HOURS PRN
Qty: 12 TABLET | Refills: 0 | Status: SHIPPED | OUTPATIENT
Start: 2021-01-01 | End: 2021-01-01

## 2021-01-01 RX ORDER — FUROSEMIDE 20 MG/1
20 TABLET ORAL DAILY
Qty: 90 TABLET | Refills: 1 | Status: SHIPPED | OUTPATIENT
Start: 2021-01-01 | End: 2021-01-01 | Stop reason: SDUPTHER

## 2021-01-01 RX ORDER — METOPROLOL SUCCINATE 50 MG/1
TABLET, EXTENDED RELEASE ORAL
Qty: 90 TABLET | Refills: 1 | Status: SHIPPED | OUTPATIENT
Start: 2021-01-01 | End: 2022-01-01 | Stop reason: SDUPTHER

## 2021-01-01 RX ORDER — LEVOFLOXACIN 500 MG/1
TABLET, FILM COATED ORAL
COMMUNITY
Start: 2021-01-01 | End: 2021-01-01 | Stop reason: ALTCHOICE

## 2021-01-01 RX ORDER — ALENDRONATE SODIUM 70 MG/1
TABLET ORAL
Qty: 12 TABLET | Refills: 1 | Status: SHIPPED | OUTPATIENT
Start: 2021-01-01 | End: 2021-01-01

## 2021-01-01 RX ORDER — CEFUROXIME AXETIL 250 MG/1
250 TABLET ORAL 2 TIMES DAILY
Qty: 20 TABLET | Refills: 0 | Status: SHIPPED | OUTPATIENT
Start: 2021-01-01 | End: 2021-01-01

## 2021-01-01 RX ORDER — SIMVASTATIN 20 MG
TABLET ORAL
Qty: 90 TABLET | Refills: 1 | Status: SHIPPED | OUTPATIENT
Start: 2021-01-01 | End: 2022-01-01 | Stop reason: SDUPTHER

## 2021-01-01 RX ORDER — FUROSEMIDE 20 MG/1
20 TABLET ORAL DAILY
Qty: 90 TABLET | Refills: 1 | Status: SHIPPED | OUTPATIENT
Start: 2021-01-01

## 2021-01-01 RX ORDER — INSULIN GLARGINE 100 [IU]/ML
INJECTION, SOLUTION SUBCUTANEOUS
COMMUNITY
Start: 2020-12-08 | End: 2021-01-01 | Stop reason: ALTCHOICE

## 2021-01-01 RX ORDER — MULTIVIT WITH MINERALS/LUTEIN
1000 TABLET ORAL DAILY
COMMUNITY

## 2021-01-01 RX ORDER — FUROSEMIDE 20 MG/1
TABLET ORAL
COMMUNITY
Start: 2021-01-01 | End: 2021-01-01 | Stop reason: SDUPTHER

## 2021-01-01 RX ORDER — PIOGLITAZONEHYDROCHLORIDE 30 MG/1
TABLET ORAL
Qty: 90 TABLET | Refills: 1 | Status: SHIPPED | OUTPATIENT
Start: 2021-01-01 | End: 2022-01-01 | Stop reason: SDUPTHER

## 2021-01-01 RX ORDER — ALENDRONATE SODIUM 70 MG/1
TABLET ORAL
Qty: 12 TABLET | Refills: 0 | Status: SHIPPED | OUTPATIENT
Start: 2021-01-01

## 2021-01-01 RX ORDER — AMOXICILLIN AND CLAVULANATE POTASSIUM 875; 125 MG/1; MG/1
1 TABLET, FILM COATED ORAL 2 TIMES DAILY
Qty: 20 TABLET | Refills: 0 | Status: SHIPPED | OUTPATIENT
Start: 2021-01-01 | End: 2021-01-01

## 2021-01-01 RX ORDER — ACETAMINOPHEN 160 MG
2000 TABLET,DISINTEGRATING ORAL 2 TIMES DAILY
COMMUNITY

## 2021-01-01 RX ORDER — LISINOPRIL 10 MG/1
10 TABLET ORAL DAILY
Qty: 90 TABLET | Refills: 1 | Status: SHIPPED | OUTPATIENT
Start: 2021-01-01 | End: 2021-01-01 | Stop reason: SDUPTHER

## 2021-01-01 RX ORDER — LISINOPRIL 10 MG/1
TABLET ORAL
COMMUNITY
Start: 2021-01-01 | End: 2021-01-01 | Stop reason: SDUPTHER

## 2021-01-01 RX ORDER — METOPROLOL SUCCINATE 50 MG/1
TABLET, EXTENDED RELEASE ORAL
COMMUNITY
Start: 2021-01-01 | End: 2021-01-01 | Stop reason: SDUPTHER

## 2021-01-01 RX ORDER — SIMVASTATIN 20 MG
TABLET ORAL
Qty: 90 TABLET | Refills: 1 | Status: SHIPPED | OUTPATIENT
Start: 2021-01-01 | End: 2021-01-01 | Stop reason: SDUPTHER

## 2021-01-01 RX ORDER — CHOLECALCIFEROL (VITAMIN D3) 25 MCG
TABLET,CHEWABLE ORAL DAILY
COMMUNITY

## 2021-01-01 RX ADMIN — HYDROCODONE BITARTRATE AND ACETAMINOPHEN 1 TABLET: 5; 325 TABLET ORAL at 16:19

## 2021-01-01 ASSESSMENT — ENCOUNTER SYMPTOMS
BACK PAIN: 0
SHORTNESS OF BREATH: 0
CHEST TIGHTNESS: 0
COUGH: 0
ABDOMINAL PAIN: 0
SORE THROAT: 0
SORE THROAT: 0
VOMITING: 0
DIARRHEA: 0
WHEEZING: 0
EYE PAIN: 0
SHORTNESS OF BREATH: 0
BACK PAIN: 1
SHORTNESS OF BREATH: 0
NAUSEA: 0
NAUSEA: 0
ABDOMINAL PAIN: 0
RHINORRHEA: 0
RHINORRHEA: 0
VOMITING: 0
VOMITING: 0
SORE THROAT: 0
DIARRHEA: 0
ABDOMINAL PAIN: 0
NAUSEA: 0
CHEST TIGHTNESS: 0
WHEEZING: 0
COUGH: 0
COUGH: 0

## 2021-01-01 ASSESSMENT — PATIENT HEALTH QUESTIONNAIRE - PHQ9
1. LITTLE INTEREST OR PLEASURE IN DOING THINGS: 0
SUM OF ALL RESPONSES TO PHQ QUESTIONS 1-9: 0
2. FEELING DOWN, DEPRESSED OR HOPELESS: 0

## 2021-01-13 ENCOUNTER — TELEPHONE (OUTPATIENT)
Dept: FAMILY MEDICINE CLINIC | Age: 86
End: 2021-01-13

## 2021-01-13 NOTE — TELEPHONE ENCOUNTER
Pts daughter called and stated pt is under hospice right now and wanted to know if pt needs to be seen in office on 1/15/21 for her appointment. I told her pt does not need to be seen by us if she is with hospice. Told her if she gets discharged to call and schedule a follow up with you.     JESUS

## 2021-01-20 NOTE — TELEPHONE ENCOUNTER
Future appt scheduled 0 appt scheduled               Last appt 01/15/2021      Last Written 10/27/2020      metFORMIN (GLUCOPHAGE) 1000 MG tablet  #180  0 RF       Refilled medication per verbal order from provider.

## 2021-01-21 RX ORDER — LANCETS
1 EACH MISCELLANEOUS DAILY
Qty: 100 EACH | Refills: 3 | Status: SHIPPED | OUTPATIENT
Start: 2021-01-21

## 2021-03-11 NOTE — ED TRIAGE NOTES
Chief Complaint   Patient presents with    Loss of Consciousness     pt had a syncopal episode in the kitchen today. injury to left ankle from the fall. hospice did xray that showed a fracture.     Ankle Pain

## 2021-03-11 NOTE — ED NOTES
Provider made aware Hospice called ahead of time and told writer patient is active hospice patient, had a fall, xray was done with fracture found, hospice provider advised family to bring patient in for treatment for comfort to be able to get around     Rc Davis RN  03/11/21 1495

## 2021-03-12 NOTE — ED PROVIDER NOTES
I did not perform an evaluation on the patient nor was I involved in the patient's care. However I was asked to interpret the EKG. The Ekg interpreted by me shows  normal sinus rhythm with a rate of 66  Axis is   Normal  QTc is  within an acceptable range  Intervals and Durations are unremarkable.       ST Segments: no acute change  No significant change from prior EKG dated 1/19/19           Hilda SandsmanDO  03/11/21 9153

## 2021-03-12 NOTE — ED PROVIDER NOTES
[address] ED  EMERGENCY DEPARTMENT ENCOUNTER        Pt Name: Eleni Jones  MRN: [id number]  [place] 1935  Date of evaluation: 3/11/2021  Provider: HAROON Valencia  PCP: LUIS Castillo - CNP    AYDIN. I have evaluated this patient. My supervising physician was available for consultation. CHIEF COMPLAINT       Chief Complaint   Patient presents with    Loss of Consciousness     pt had a syncopal episode in the kitchen today. injury to left ankle from the fall. hospice did xray that showed a fracture.  Ankle Pain       HISTORY OF PRESENT ILLNESS   (Location, Timing/Onset, Context/Setting, Quality, Duration, Modifying Factors, Severity, Associated Signs and Symptoms)  Note limiting factors. Eleni Jones is a 82 y.o. female presents to the emergency department for left ankle fracture. 2 days ago, patient was in her kitchen reaching for some food when she had a syncopal episode. Her grandson found her on the ground approximately 4 to 5 minutes after, she does not recall anything that occurred during this time. She is currently hospice care, does not want further evaluation for the syncope. She has had pain of her left ankle since this episode, the plan was for hospitalist to perform an x-ray yesterday but they were unable to come until today, noted to have a fracture of her ankle. Referred to the emergency department for stabilization, she does not want to be admitted to the hospital under any circumstance and would like to follow-up with orthopedics as outpatient. Denies numbness, weakness, cyanosis, chest pain, shortness of breath, headache, abdominal pain. Nursing Notes were all reviewed and agreed with or any disagreements were addressed in the HPI. REVIEW OF SYSTEMS    (2-9 systems for level 4, 10 or more for level 5)     Review of Systems   Constitutional: Negative for fever. HENT: Negative for sore throat.     Eyes: Negative for pain and visual disturbance. Respiratory: Negative for cough and shortness of breath. Cardiovascular: Negative for chest pain. Gastrointestinal: Negative for abdominal pain, nausea and vomiting. Genitourinary: Negative for dysuria and frequency. Musculoskeletal: Positive for arthralgias. Negative for back pain and neck pain. Skin: Negative for rash. Neurological: Positive for syncope. Negative for dizziness, weakness, numbness and headaches. Psychiatric/Behavioral: Negative for confusion. Positives and Pertinent negatives as per HPI. Except as noted above in the ROS, all other systems were reviewed and negative.        PAST MEDICAL HISTORY     Past Medical History:   Diagnosis Date    Bacteremia 01/19/2019    E coli    [facility]     Hyperlipidemia     Hypertension     Lower GI bleed 08/08/2017    [address]; biopsy nonspecific colitis    Subdural hematoma ([facility] 12/14    ?syncopal spell    Urinary tract infection 9/27/2019    Urinary tract infection, site not specified          SURGICAL HISTORY     Past Surgical History:   Procedure Laterality Date    CARPAL TUNNEL RELEASE  1991    CATARACT REMOVAL Bilateral     COLONOSCOPY  2007    normal per pt    COLONOSCOPY  08/31/2017    [place]    [place] OPEN RX DISTAL RADIUS FX, INTRA-ARTICULAR, 3+ FRAG Right 11/8/2018    RIGHT DISTAL RADIUS OPEN REDUCTION INTERNAL FIXATION  -REGIONAL BLOCK- performed by Lakesha Lugo MD at [address] Right     orif         Pearla Nice       Discharge Medication List as of 3/11/2021  5:52 PM      CONTINUE these medications which have NOT CHANGED    Details   ONE TOUCH ULTRASOFT LANCETS MISC DAILY Starting Thu 1/21/2021, Disp-100 each, R-3, [id number]      metFORMIN (GLUCOPHAGE) 1000 MG tablet TAKE ONE TABLET BY MOUTH TWICE A DAY WITH MEALS, Disp-180 tablet, R-0Normal      atenolol (TENORMIN) 25 MG tablet TAKE ONE TABLET BY MOUTH DAILY, not diaphoretic. HENT:      Head: Normocephalic and atraumatic. Nose: No congestion or rhinorrhea. Eyes:      General: No scleral icterus. Conjunctiva/sclera: Conjunctivae normal.   Neck:      Musculoskeletal: Normal range of motion and neck supple. No muscular tenderness. Cardiovascular:      Rate and Rhythm: Normal rate and regular rhythm. Pulses: Normal pulses. Heart sounds: Normal heart sounds. No murmur. No friction rub. No gallop. Pulmonary:      Effort: Pulmonary effort is normal. No respiratory distress. Breath sounds: Normal breath sounds. No stridor. No wheezing, rhonchi or rales. Abdominal:      General: There is no distension. Palpations: Abdomen is soft. Tenderness: There is no abdominal tenderness. There is no guarding or rebound. Musculoskeletal:      Comments: Decreased range of motion of left ankle secondary to pain. Lateral left malleoli or tenderness that reproduces pain. Negative Rosales's test.  No proximal tib-fib, knee, femur, hip tenderness or laxity. 2+ DP and PT pulses. Cap refill less than 2 seconds throughout. Distal motor and sensory intact. Skin:     General: Skin is warm and dry. Capillary Refill: Capillary refill takes less than 2 seconds. Neurological:      General: No focal deficit present. Mental Status: She is alert and oriented to person, place, and time. Sensory: No sensory deficit. Motor: No weakness. Psychiatric:         Mood and Affect: Mood normal.         Behavior: Behavior normal.         DIAGNOSTIC RESULTS   LABS:    Labs Reviewed - No data to display    All other labs were within normal range or not returned as of this dictation. EKG: All EKG's are interpreted by the Emergency Department Physician in the absence of a cardiologist.  Please see their note for interpretation of EKG.       RADIOLOGY:   Non-plain film images such as CT, Ultrasound and MRI are read by the radiologist. Julse Banks BP: (!) 129/54 (!) 113/58   Pulse: 65 64   Resp: 18 16   Temp: 98 °F (36.7 °C)    TempSrc: Oral    SpO2: 98% 99%   Weight: 160 lb (72.6 kg)    Height: 5' 6\" (1.676 m)        Patient was given the following medications:  Medications   HYDROcodone-acetaminophen (NORCO) 5-325 MG per tablet 1 tablet (1 tablet Oral Given 3/11/21 1619)           61-year-old female presents emerged from for left ankle injury. This occurred after a syncopal episode, patient is a hospice care patient does not want to be admitted or worked up further for her syncope. We will honor the patient's wishes. X-ray notable for distal fibular fracture. Neurovascular intact. Posterior short leg splint placed, neurovascular tact pre and post splint placement. Given referral information for on-call orthopedic physician, recommend follow-up within 1 week. Instructed to return to the emergency room for new or worsening symptoms including but not limited to severe pain, numbness, weakness, cyanosis, chest pain, shortness of breath, headache, any other symptoms she is concerned about. FINAL IMPRESSION      1. Closed fracture of left ankle, initial encounter          DISPOSITION/PLAN   DISPOSITION Decision To Discharge 03/11/2021 05:46:36 PM      PATIENT REFERREDTO:  Eleuterio Salomon MD  [address]  [phone]    Call in 1 day  Call to schedule orthopedic follow-up ideally to be seen within 1 week      DISCHARGE MEDICATIONS:  Discharge Medication List as of 3/11/2021  5:52 PM      START taking these medications    Details   HYDROcodone-acetaminophen (NORCO) 5-325 MG per tablet Take 1 tablet by mouth every 6 hours as needed for Pain for up to 3 days. Intended supply: 3 days.  Take lowest dose possible to manage pain, Disp-12 tablet, R-0Normal             DISCONTINUED MEDICATIONS:  Discharge Medication List as of 3/11/2021  5:52 PM                 (Please note that portions of this note were completed with a voice recognition program.  Efforts were made to edit the dictations but occasionally words are mis-transcribed.)    HAROON Valencia (electronically signed)         HAROON Valencia  03/12/21 6968

## 2021-03-17 PROBLEM — G47.00 INSOMNIA DISORDER: Status: ACTIVE | Noted: 2017-08-08

## 2021-03-17 PROBLEM — D62 ACUTE BLOOD LOSS ANEMIA: Status: ACTIVE | Noted: 2017-08-08

## 2021-03-17 PROBLEM — N18.30 CKD (CHRONIC KIDNEY DISEASE) STAGE 3, GFR 30-59 ML/MIN (HCC): Status: ACTIVE | Noted: 2017-08-08

## 2021-03-17 PROBLEM — K62.5 RECTAL BLEEDING: Status: ACTIVE | Noted: 2017-08-08

## 2021-03-17 NOTE — PROGRESS NOTES
extended release tablet TAKE 1 TABLET BY MOUTH EVERY DAY      lisinopril (PRINIVIL;ZESTRIL) 10 MG tablet       levoFLOXacin (LEVAQUIN) 500 MG tablet       furosemide (LASIX) 20 MG tablet       BASAGLAR KWIKPEN 100 UNIT/ML injection pen TAKE 5 UNITS TWICE DAILY, HOLD IF BLOOD SUGAR IS LESS THAN 200.  B-D ULTRAFINE III SHORT PEN 31G X 8 MM MISC USE 1 NEEDLE SUBCUTANEOUSLY TWICE A DAY      ONE TOUCH ULTRASOFT LANCETS MISC 1 each by Does not apply route daily E11.9 100 each 3    metFORMIN (GLUCOPHAGE) 1000 MG tablet TAKE ONE TABLET BY MOUTH TWICE A DAY WITH MEALS 180 tablet 0    alendronate (FOSAMAX) 70 MG tablet TAKE 1 TABLET BY MOUTH ONCE WEEKLY ON AN EMPTY STOMACH BEFORE BREAKFAST. REMAIN UPRIGHT FOR 30 MINUTES & TAKE WITH 8 OUNCES OF WATER 12 tablet 0    blood glucose test strips (ASCENSIA AUTODISC VI;ONE TOUCH ULTRA TEST VI) strip 1 each by In Vitro route daily As needed. 100 each 5    pioglitazone (ACTOS) 30 MG tablet TAKE ONE TABLET BY MOUTH DAILY 90 tablet 0    simvastatin (ZOCOR) 20 MG tablet TAKE ONE TABLET BY MOUTH ONCE NIGHTLY 90 tablet 1    glimepiride (AMARYL) 2 MG tablet TAKE ONE TABLET BY MOUTH TWICE A  tablet 2    Blood Glucose Monitoring Suppl (ONE TOUCH ULTRA 2) w/Device KIT Test once daily Dx: E11.9 1 kit 0    Melatonin 5 MG CAPS Take 5 mg by mouth nightly as needed (sleep) 1 capsule 0    sitaGLIPtin (JANUVIA) 100 MG tablet Take 100 mg by mouth daily       No current facility-administered medications for this visit. ALLERGIES     Allergies   Allergen Reactions    Morphine Nausea And Vomiting       FAMILY HISTORY     Family History   Problem Relation Age of Onset    High Blood Pressure Mother     Stroke Mother     Kidney Disease Father     High Blood Pressure Sister     Diabetes Brother     Heart Disease Brother        SOCIAL HISTORY     Social History     Socioeconomic History    Marital status:       Spouse name: None    Number of children: None    Years of education: None    Highest education level: None   Occupational History    None   Social Needs    Financial resource strain: None    Food insecurity     Worry: None     Inability: None    Transportation needs     Medical: None     Non-medical: None   Tobacco Use    Smoking status: Never Smoker    Smokeless tobacco: Never Used   Substance and Sexual Activity    Alcohol use: No     Alcohol/week: 0.0 standard drinks    Drug use: None    Sexual activity: None   Lifestyle    Physical activity     Days per week: None     Minutes per session: None    Stress: None   Relationships    Social connections     Talks on phone: None     Gets together: None     Attends Protestant service: None     Active member of club or organization: None     Attends meetings of clubs or organizations: None     Relationship status: None    Intimate partner violence     Fear of current or ex partner: None     Emotionally abused: None     Physically abused: None     Forced sexual activity: None   Other Topics Concern    None   Social History Narrative    None       REVIEW OF SYSTEMS   General: no fever, chills, night sweats, anorexia, malaise, fatigue, or weight change  Hematologic:  no unexplained bleeding or bruising  HEENT:   no nasal congestion, rhinorrhea, sore throat, or facial pain  Respiratory:  no cough, dyspnea, or chest pain  Cardiovascular:  no angina, PRATHER, PND, orthopnea, dependent edema, or palpitations  Gastrointestinal:  no nausea, vomiting, diarrhea, constipation, or abdominal pain  Genitourinary:  no urinary urgency, frequency, dysuria, or hematuria  Musculoskeletal: see HPI  Endocrine:  no heat or cold intolerance and no polyphagia, polydipsia, or polyuria  Skin:  no skin eruptions or changing lesions  Neurologic:  no focal weakness, numbness/tingling, tremor, or severe headache. See HPI. See HPI for pertinent positives.     PHYSICAL EXAM   Vital Signs:   Vitals:    03/17/21 1049   Weight: 160 lb (72.6 kg)   Height: 5' 6\" (1.676 m)       General appearance: healthy, alert, no distress  Skin: Skin color, texture, turgor normal. No rashes or lesions  HEENT: atraumatic, normocephalic. PERRL  Respiratory: Unlabored breathing  Lymphatic: No adenopathy   Neuro: Alert and oriented, normal distal sensation, normal bilateral DTRs  Vascular: Normal distal capillary and distal pulses  Muskuloskeletal Exam: Left ankle examination does demonstrate swelling and ecchymosis with tenderness over the lateral ankle and medial ankle. No gross deformity. She is neurovascular intact    RADIOLOGY   X-rays obtained and reviewed in office:  Views left ankle 3 views    Impression: There is a displaced Landeros B distal fibula fracture with lateral displacement of the talus and widening of the medial clear space    IMPRESSION     1. Closed fracture of distal end of left fibula, unspecified fracture morphology, initial encounter         PLAN   I had a lengthy discussion with patient today regarding diagnosis and treatment options and recommendations. I believe the fracture is best treated with open reduction and internal fixation in order to restore the ankle mortise. We discussed the risk benefits complications possible outcomes and expected postoperative course. The patient understands would like to proceed. We will temporarily immobilized the ankle in a removable walking cast boot. She will need medical evaluation and clearance prior to surgery. FOLLOWUP     Return for first post operative visit. Orders Placed This Encounter   Procedures    Nikki / Nirmal Batista Tall Walking Boot     Patient was prescribed a Tall Walking Boot. The left ankle will require stabilization / immobilization from this semi-rigid / rigid orthosis to improve their function. The orthosis will assist in protecting the affected area, provide functional support and facilitate healing.     Patient was instructed to progress ambulation weight bearing as tolerated in the device. The patient was educated and fit by a healthcare professional with expert knowledge and specialization in brace application while under the direct supervision of the physician. Verbal and written instructions for the use of and application of this item were provided. They were instructed to contact the office immediately should the brace result in increased pain, decreased sensation, increased swelling or worsening of the condition. No orders of the defined types were placed in this encounter.       Patient was instructed on appropriate use of braces, participation in home exercise programs, healthy lifestyle choices and weight loss as appropriate     Romina Lisa MD

## 2021-03-17 NOTE — LETTER
[address]   DR. ABI MALLORY     TODAY'S DATE: 3/17/21    PATIENT'S NAME: Frankie Guido    PATIENT'S NUMBER: [id number]    : 1935    PREFERRED PHONE NUMBER: [phone]      WORK PHONE NUMBER: There is no work phone number on file. DIAGNOSIS:   1.  Closed fracture of distal end of left fibula, unspecified fracture morphology, initial encounter        PROCEDURE: Open reduction internal fixation left distal fibula fracture    SURGEON: Dr. Dong Camp: [facility]    HOSPITAL: [facility]: Outpatient/Same Day Surgery    ANESTHESIA: General   Pre-Op Block requested per Anesthesia's Choice     LENGTH OF SURGERY: 1 hour    EQUIPMENT REQUESTED: Synthes locking distal fibula plate,      X-RAYS REQUIRED: C-ARM      PCP: LUIS Fallon CNP    H&P TO BE DONE BY THE PCP      CARDIAC CLEARANCE NEEDED: Yes     ALLERGIES:   Allergies   Allergen Reactions    Morphine Nausea And Vomiting       DME: Cast Boot    POLAR CARE: No DEEPA/SCD: No GAIT TRAINING: No     POST-OP VISIT: 10 Days    OTHER INSTRUCTIONS/REMARKS:     INSURANCE INFORMATION: _________________________ CARD IN MEDIA IN EPIC___  CARD FAXED___    PRE-CERTIFICATION REQUIRED: YES   NO   PER _______________________    SURGERY SCHEDULED BY: ____________________________________    PATIENT CALLED AND CONFIRMED DATE & TIME: ______________________

## 2021-03-17 NOTE — TELEPHONE ENCOUNTER
3/17/21 [facility] [place]  - NO PRECERT REQUIRED FOR PARTICIPATING PROVIDERS - PER RECORDING -   REF # [id number]  -   MP

## 2021-03-17 NOTE — LETTER
[address]   INFORMED CONSENT FOR SURGICAL PROCEDURES     I, Piedad Levi, hereby authorize Dr. Akil Hernadez and/or such associates as may be selected by him/her to perform the following operations:   Open reduction internal fixation left distal fibula fracture    I hereby acknowledge that in giving this consent the nature and purpose of the procedure, alternative means of therapy, if any, and reasonably known risks, complications, and discomfort have been explained to me by the above named doctor(s). I authorize the above named doctor(s) to carry out an extension or to perform any other procedure that in her judgement is advisable on the basis of findings during the course of said operation upon the above named patient. I am aware that the practice of medicine and surgery is not an exact science and that the possibility and nature of results or complications cannot be anticipated with complete accuracy. I acknowledge that no guarantees, express or implied, have been made as to the results of the above described procedure or any cure. I acknowledge that disclosure of information has been made to me regarding the nature and purpose of the operation together with the reasonably known risks and that all questions I have asked about the procedure have been answered in a satisfactory manner.           Dr. Akil Hernadez       __________________________________  Patient/Guardian or Spouse Signature     __________________________________  Witness   3/17/21 11:38 AM

## 2021-03-18 NOTE — PROGRESS NOTES
CHIEF COMPLAINT  No chief complaint on file. VELASQUEZ Graham is a 82 y.o. female who presents to the office complaining of ***  No other complaints, modifying factors or associated symptoms. Nursing notes reviewed. Past Medical History:   Diagnosis Date    Bacteremia 01/19/2019    E coli    Diabetes mellitus ([facility] [address]     Hyperlipidemia     Hypertension     Lower GI bleed 08/08/2017    [address]; biopsy nonspecific colitis    Subdural hematoma ([facility] [address] 12/14    ?syncopal spell    Urinary tract infection 9/27/2019    Urinary tract infection, site not specified      Past Surgical History:   Procedure Laterality Date    CARPAL TUNNEL RELEASE  1991    CATARACT REMOVAL Bilateral     COLONOSCOPY  2007    normal per pt    COLONOSCOPY  08/31/2017    Colitis    [place] OPEN RX DISTAL RADIUS FX, INTRA-ARTICULAR, 3+ FRAG Right 11/8/2018    RIGHT DISTAL RADIUS OPEN REDUCTION INTERNAL FIXATION  -REGIONAL BLOCK- performed by Emilia Hills MD at [address] Right     orif     Family History   Problem Relation Age of Onset    High Blood Pressure Mother     Stroke Mother     Kidney Disease Father     High Blood Pressure Sister     Diabetes Brother     Heart Disease Brother      Social History     Socioeconomic History    Marital status:       Spouse name: Not on file    Number of children: Not on file    Years of education: Not on file    Highest education level: Not on file   Occupational History    Not on file   Social Needs    Financial resource strain: Not on file    Food insecurity     Worry: Not on file     Inability: Not on file    Transportation needs     Medical: Not on file     Non-medical: Not on file   Tobacco Use    Smoking status: Never Smoker    Smokeless tobacco: Never Used   Substance and Sexual Activity    Alcohol use: No     Alcohol/week: 0.0 standard drinks    Drug use: Not on file    Sexual activity: Not on file   Lifestyle    Physical activity     Days per week: Not on file     Minutes per session: Not on file    Stress: Not on file   Relationships    Social connections     Talks on phone: Not on file     Gets together: Not on file     Attends Muslim service: Not on file     Active member of club or organization: Not on file     Attends meetings of clubs or organizations: Not on file     Relationship status: Not on file    Intimate partner violence     Fear of current or ex partner: Not on file     Emotionally abused: Not on file     Physically abused: Not on file     Forced sexual activity: Not on file   Other Topics Concern    Not on file   Social History Narrative    Not on file     Current Outpatient Medications   Medication Sig Dispense Refill    metoprolol succinate (TOPROL XL) 50 MG extended release tablet TAKE 1 TABLET BY MOUTH EVERY DAY      lisinopril (PRINIVIL;ZESTRIL) 10 MG tablet       levoFLOXacin (LEVAQUIN) 500 MG tablet       furosemide (LASIX) 20 MG tablet       BASAGLAR KWIKPEN 100 UNIT/ML injection pen TAKE 5 UNITS TWICE DAILY, HOLD IF BLOOD SUGAR IS LESS THAN 200.  B-D ULTRAFINE III SHORT PEN 31G X 8 MM MISC USE 1 NEEDLE SUBCUTANEOUSLY TWICE A DAY      ONE TOUCH ULTRASOFT LANCETS MISC 1 each by Does not apply route daily E11.9 100 each 3    metFORMIN (GLUCOPHAGE) 1000 MG tablet TAKE ONE TABLET BY MOUTH TWICE A DAY WITH MEALS 180 tablet 0    alendronate (FOSAMAX) 70 MG tablet TAKE 1 TABLET BY MOUTH ONCE WEEKLY ON AN EMPTY STOMACH BEFORE BREAKFAST. REMAIN UPRIGHT FOR 30 MINUTES & TAKE WITH 8 OUNCES OF WATER 12 tablet 0    blood glucose test strips (ASCENSIA AUTODISC VI;ONE TOUCH ULTRA TEST VI) strip 1 each by In Vitro route daily As needed.  100 each 5    pioglitazone (ACTOS) 30 MG tablet TAKE ONE TABLET BY MOUTH DAILY 90 tablet 0    simvastatin (ZOCOR) 20 MG tablet TAKE ONE TABLET BY MOUTH ONCE NIGHTLY 90 tablet 1    glimepiride (AMARYL) 2 MG tablet TAKE ONE TABLET BY

## 2021-03-18 NOTE — PROGRESS NOTES
Preop history and physical    Patient:  Kevan Berger   YOB: 1935     Subjective:  Patient presents for evaluation of  her preop examination. She is scheduled for open reduction and internal fixation of the left ankle due to fracture. She reports fall over 1 week ago sustaining fracture. Patient reports prior to fall she was ambulatory without assistive devices. Patient reports that when she goes on long walks outside she does use a Rollator. Patient reports going to the hospital several days afterwards and had repeat imaging performed which revealed the closed fracture of the distal left fibula. Patient reports following up with orthopedics. Patient and daughter present to visit today and reports that they are unsure of when her surgery will be. Patient reports surgeries in the past with no difficulties with anesthesia or easily bleeding. Patient denies any current use of anti-inflammatories, aspirin, anticoagulation therapy, vitamin E and or fish oil. Patient denies any recent changes to medications or medical illness. Patient reports taking medications as prescribed. Patient Active Problem List    Diagnosis Date Noted    Benign essential hypertension      Priority: High    Type 2 diabetes mellitus without complication, without long-term current use of insulin (HCC)      Priority: High     No aspirin - hx subdural hematoma      Pure hypercholesterolemia 03/21/2016     Priority: Medium    B12 deficiency      Priority: Low     injections        Vitamin D deficiency disease      Priority: Low     2000 units qd.   Last level: 7/10/2018       Recurrent UTI 09/25/2019     Topical premarin      Abnormal CXR (chest x-ray) 01/28/2019     Asbestos exposure through ; last CXR 6/30/2020       Osteopenia of multiple sites 01/10/2019     FRAX Score   Major Osteoporotic: 29   Hip Fracture: 19       Psychophysiologic insomnia 07/09/2018     melatonin      Insomnia disorder 08/08/2017  Rectal bleeding 08/08/2017    CKD (chronic kidney disease) stage 3, GFR 30-59 ml/min 08/08/2017    Acute blood loss anemia 08/08/2017       Past Medical History:   Diagnosis Date    Bacteremia 01/19/2019    E coli    Diabetes mellitus ([facility] [address]     Hyperlipidemia     Hypertension     Lower GI bleed 08/08/2017    [address]; biopsy nonspecific colitis    Subdural hematoma ([facility] [address] 12/14    ?syncopal spell    Urinary tract infection 9/27/2019    Urinary tract infection, site not specified         Past Surgical History:   Procedure Laterality Date    CARPAL TUNNEL RELEASE  1991    CATARACT REMOVAL Bilateral     COLONOSCOPY  2007    normal per pt    COLONOSCOPY  08/31/2017    Colitis    MO OPEN RX DISTAL RADIUS FX, INTRA-ARTICULAR, 3+ FRAG Right 11/8/2018    RIGHT DISTAL RADIUS OPEN REDUCTION INTERNAL FIXATION  -REGIONAL BLOCK- performed by Carlos Suárez MD at [address] Right     orif        Current Outpatient Medications on File Prior to Visit   Medication Sig Dispense Refill    metoprolol succinate (TOPROL XL) 50 MG extended release tablet TAKE 1 TABLET BY MOUTH EVERY DAY      lisinopril (PRINIVIL;ZESTRIL) 10 MG tablet       levoFLOXacin (LEVAQUIN) 500 MG tablet       furosemide (LASIX) 20 MG tablet       BASAGLAR KWIKPEN 100 UNIT/ML injection pen TAKE 5 UNITS TWICE DAILY, HOLD IF BLOOD SUGAR IS LESS THAN 200.  B-D ULTRAFINE III SHORT PEN 31G X 8 MM MISC USE 1 NEEDLE SUBCUTANEOUSLY TWICE A DAY      ONE TOUCH ULTRASOFT LANCETS MISC 1 each by Does not apply route daily E11.9 100 each 3    metFORMIN (GLUCOPHAGE) 1000 MG tablet TAKE ONE TABLET BY MOUTH TWICE A DAY WITH MEALS 180 tablet 0    alendronate (FOSAMAX) 70 MG tablet TAKE 1 TABLET BY MOUTH ONCE WEEKLY ON AN EMPTY STOMACH BEFORE BREAKFAST.  REMAIN UPRIGHT FOR 30 MINUTES & TAKE WITH 8 OUNCES OF WATER 12 tablet 0    blood glucose test strips (ASCENSIA AUTODISC VI;ONE TOUCH ULTRA TEST VI) strip 1 each by In Vitro route daily As needed. 100 each 5    pioglitazone (ACTOS) 30 MG tablet TAKE ONE TABLET BY MOUTH DAILY 90 tablet 0    simvastatin (ZOCOR) 20 MG tablet TAKE ONE TABLET BY MOUTH ONCE NIGHTLY 90 tablet 1    glimepiride (AMARYL) 2 MG tablet TAKE ONE TABLET BY MOUTH TWICE A  tablet 2    Blood Glucose Monitoring Suppl (ONE TOUCH ULTRA 2) w/Device KIT Test once daily Dx: E11.9 1 kit 0    Melatonin 5 MG CAPS Take 5 mg by mouth nightly as needed (sleep) 1 capsule 0    sitaGLIPtin (JANUVIA) 100 MG tablet Take 100 mg by mouth daily       No current facility-administered medications on file prior to visit.          Allergies   Allergen Reactions    Morphine Nausea And Vomiting        Family History   Problem Relation Age of Onset    High Blood Pressure Mother     Stroke Mother     Kidney Disease Father     High Blood Pressure Sister     Diabetes Brother     Heart Disease Brother         Social History     Tobacco Use    Smoking status: Never Smoker    Smokeless tobacco: Never Used   Substance Use Topics    Alcohol use: No     Alcohol/week: 0.0 standard drinks         Immunization History   Administered Date(s) Administered    DT (pediatric) 08/08/1975, 05/03/1990, 06/16/2005    Influenza Vaccine, unspecified formulation 11/14/2003, 10/25/2007, 10/30/2008, 10/26/2009, 11/08/2011    Influenza Virus Vaccine 12/11/2009, 10/19/2015, 10/12/2016, 10/08/2018, 09/11/2020    Influenza, High Dose (Fluzone 65 yrs and older) 10/21/2015, 10/12/2016, 10/06/2019    Influenza, Intradermal, Preservative free 11/01/2012, 10/24/2013, 10/31/2014    Influenza, Quadv, IM, (6 mo and older Fluzone, Flulaval, Fluarix and 3 yrs and older Afluria) 10/09/2017    Influenza, Quadv, adjuvanted, 65 yrs +, IM, PF (Fluad) 09/14/2020    Pneumococcal Conjugate 13-valent ([id number]) 12/02/2013    Pneumococcal Polysaccharide ([id number]) 06/05/2002, 12/31/2007    Polio OPV 05/03/1990    Tdap (Boostrix, Adacel) 10/15/2014       Review of systems:  Constitutional:     Unexplained weight loss - no     Fever - no  Skin:     Rash - no     Itching - no  ENT:     Head congestion - no     Hearing loss - no  Eye:     Blurred vision - no     Eye pain - no  Cardiac:     Chest pain or discomfort - no     Orthopnea or PND - no  Respiratory     Cough - no     Wheeze - no     Dyspnea on exertion - no  Gastrointestinal     Frequent heartburn - no     Blood in stool - no  Urologic     Dysuria - no     Hematuria - no  Neurologic     Dizziness - no     Syncope - no  Psychiatric     Suicidal ideation - no     Anxiety - no    Objective:  /65   Pulse 62   Temp 98.1 °F (36.7 °C) (Oral)   Ht 5' 6\" (1.676 m)   SpO2 99%   BMI 25.82 kg/m²   Patient is alert, oriented, and in no acute distress. Eyes:  Conjunctivae and lids are clear             Pupils are equal, round, and reactive, irises nondeformed  Ears:  External ears and nose unremarkable, canals are clear, TMs clear   Mouth:  Lips, gums, tongue, oral mucosa unremarkable  Throat: Palates unremarkable               Pharynx clear  Nose: clear  Neck:  No masses, trachea midline, phonation normal, thyroid unremarkable              No significant cervical or supraclavicular adenopathy  Chest:  No deformity of thorax              Respirations easy and unlabored with equal breath sounds              Lungs clear  Heart: Regular rate, rhythm 62.   Abdomen:  Soft  with no masses noted                     Hernia - none                    Liver and spleen not palpably enlarged                    Bowel sounds are normally active                    Tenderness - none                    Rebound or rididity - none  Neurologic:  Cranial nerves 2-12 are intact                      No ataxia                     No focal motor deficits found                        Reflexes in upper extremities are intact and symmetrical                      Reflexes in lower extremities are intact and symmetrical  Skin: Warm and dry, turgor normal           No rash            No lesion  Psychiatric: mood and affect intact                     speech and thought processes seem appropriate       Assessment and Plan:  1. Pre-op exam  Patient presents today for preop examination. Patient reports fall approximately 1 week ago sustaining a left distal fibula fracture. Patient reported it to home care nursing and had imaging performed which revealed fracture. Patient did go to the hospital where EKG was performed and repeat x-ray. Patient was referred to orthopedics which she followed up with yesterday. Patient placed in orthotic boot. Patient reports normally she is up and walking with no use of ambulatory needs unless she is going outside she does use her Rollator. Patient denies any episodes of dizziness, lightheadedness, chest pain or shortness of breath. No abdominal pain or discomfort. No nausea, vomiting, diarrhea. No dark or tarry stools. Patient reports that she takes her medications as prescribed. Patient reports family reports that fracture was a result of a syncopal episode. Patient's family reports that she has this happen when she has UTIs. Urinalysis was obtained per home health and revealed UTI. Patient was placed on Levaquin on Monday. Patient and family both report this is very common and has happened in the past.  Patient reports eating and drinking appropriately with no nausea, vomiting, diarrhea, fever or chills. Patient's blood pressure today 122/65, heart rate 62, 99% on room air. Patient reports that she is feeling well and reports pain is controlled at this time. Patient has history of type 2 diabetes mellitus, chronic kidney disease, osteopenia, benign essential hypertension, pure hypercholesteremia, vitamin D deficiency, B12 deficiency, insomnia, reoccurring UTIs. Patient denies any previous cardiac work-up or history of coronary artery disease.   Patient reports that she did have Covid in December and at one point was told she did have CHF. Patient's family member reports that she has never had a history of this and has been recovering well. Patient denies any shortness of breath upon exertion or rest, wheezing, hemoptysis, edema, or frequent fluctuation of weight. Patient is prescribed Lasix 20 mg reports taking it daily. Patient denies any history of cardiac work-up. We did discuss in detail the importance of risks versus benefits of surgery and associated comorbidities. I did speak with onsite cardiologist regarding patient's care and plan of treatment. We discussed obtaining echocardiogram for further cardiac work-up or consulting for preop cardiac clearance visit. I did discuss this in detail with patient and family member. I did express patient's risks associated with having surgery. Patient declines further cardiac work-up and reports that she will still have the surgery to help fix her leg so that she can resume to normal activities and lifestyle. Patient's family were at bedside verbalized and acknowledges as well. It is in my medical opinion that this patient does have moderate risk patient aware of these risk and may proceed with intended surgery as planned. - COMPREHENSIVE METABOLIC PANEL  - CBC Auto Differential    2. At high risk for falls  On the basis of positive falls risk screening, assessment and plan is as follows: home safety tips provided. 3. Type 2 diabetes mellitus without complication, without long-term current use of insulin (HCC)  Stable. Previous A1c was 8.2. Patient reports checking blood sugar and taking medications as prescribed. Patient currently on Metformin 1000 mg twice a day, Actos 30 mg daily, glimepiride 2 mg twice a day, Januvia 100 mg daily, Basaglar 5 units twice a day if blood sugar is greater than 130. Patient's daughter reports they monitor on a close basis. No recent episodes of hyperglycemia or hypoglycemia.   Labs ordered and pending. Continue with current treatment and management follow-up in 4 to 6 months, sooner for new or worsening symptoms.  - HEMOGLOBIN A1C  - COMPREHENSIVE METABOLIC PANEL  - CBC Auto Differential        The note was completed using Dragon voice recognition transcription. Every effort was made to ensure accuracy; however, inadvertent  transcription errors may be present despite my best efforts to edit errors.

## 2021-06-28 PROBLEM — G47.00 INSOMNIA DISORDER: Status: RESOLVED | Noted: 2017-08-08 | Resolved: 2021-01-01

## 2021-06-28 NOTE — PATIENT INSTRUCTIONS
Please read the healthy family handout that you were given and share it with your family. Please compare this printed medication list with your medications at home to be sure they are the same. If you have any medications that are different please contact us immediately at [phone]. Also review your allergies that we have listed, these may also include medications that you have not been able to tolerate, make sure everything listed is correct. If you have any allergies that are different please contact us immediately at [phone].

## 2021-06-28 NOTE — PROGRESS NOTES
CHIEF COMPLAINT  Chief Complaint   Patient presents with   Geraldine Carbon     HTN        HPI   Gisell Mack is a 82 y.o. female who presents to the office for checkup. Patient presents with family member. Patient previously with hospice due to Covid care and complications but has been since then discharged. Patient reports that she has recently been discharged from orthopedics as well status post left ankle fracture/surgery. Patient reports overall doing well using her Rollator when outside. Patient denies any recent falls. No new unusual fatigue or unexplained weight loss. No chest pain or shortness of breath. No abdominal pain or discomfort. No nausea, vomiting, diarrhea. Patient's daughter reports checking blood sugars twice a day. Patient does take medications as prescribed. Patient daughter concerned today because of chronic UTIs. Patient's only symptom is confusion and passing out. Patient's daughter would like her urine checked while she is here today. Otherwise no recent changes in medications. No other complaints, modifying factors or associated symptoms. Nursing notes reviewed.    Past Medical History:   Diagnosis Date    Bacteremia 01/19/2019    E coli    Diabetes mellitus ([facility] [address]     Hyperlipidemia     Hypertension     Lower GI bleed 08/08/2017    [address]; biopsy nonspecific colitis    Subdural hematoma ([address] 12/14    ?syncopal spell    Urinary tract infection 9/27/2019    Urinary tract infection, site not specified      Past Surgical History:   Procedure Laterality Date    CARPAL TUNNEL RELEASE  1991    CATARACT REMOVAL Bilateral     COLONOSCOPY  2007    normal per pt    COLONOSCOPY  08/31/2017    Colitis    ME OPEN RX DISTAL RADIUS FX, INTRA-ARTICULAR, 3+ FRAG Right 11/8/2018    RIGHT DISTAL RADIUS OPEN REDUCTION INTERNAL FIXATION  -REGIONAL BLOCK- performed by Jaycee Montoya MD at [address] Right     orif Family History   Problem Relation Age of Onset    High Blood Pressure Mother     Stroke Mother     Kidney Disease Father     High Blood Pressure Sister     Diabetes Brother     Heart Disease Brother      Social History     Socioeconomic History    Marital status:      Spouse name: Not on file    Number of children: Not on file    Years of education: Not on file    Highest education level: Not on file   Occupational History    Not on file   Tobacco Use    Smoking status: Never Smoker    Smokeless tobacco: Never Used   Substance and Sexual Activity    Alcohol use: No     Alcohol/week: 0.0 standard drinks    Drug use: Not on file    Sexual activity: Not on file   Other Topics Concern    Not on file   Social History Narrative    Not on file     Social Determinants of Health     Financial Resource Strain:     Difficulty of Paying Living Expenses:    Food Insecurity:     Worried About Running Out of Food in the Last Year:     [address] in the Last Year:    Transportation Needs:     Lack of Transportation (Medical):      Lack of Transportation (Non-Medical):    Physical Activity:     Days of Exercise per Week:     Minutes of Exercise per Session:    Stress:     Feeling of Stress :    Social Connections:     Frequency of Communication with Friends and Family:     Frequency of Social Gatherings with Friends and Family:     Attends Jewish Services:     Active Member of Clubs or Organizations:     Attends Club or Organization Meetings:     Marital Status:    Intimate Partner Violence:     Fear of Current or Ex-Partner:     Emotionally Abused:     Physically Abused:     Sexually Abused:      Current Outpatient Medications   Medication Sig Dispense Refill    furosemide (LASIX) 20 MG tablet Take 1 tablet by mouth daily 90 tablet 1    lisinopril (PRINIVIL;ZESTRIL) 10 MG tablet Take 1 tablet by mouth daily 90 tablet 1    metoprolol succinate (TOPROL XL) 50 MG extended release tablet TAKE 1 TABLET BY MOUTH EVERY DAY 90 tablet 1    simvastatin (ZOCOR) 20 MG tablet TAKE ONE TABLET BY MOUTH ONCE NIGHTLY 90 tablet 1    metFORMIN (GLUCOPHAGE) 1000 MG tablet TAKE ONE TABLET BY MOUTH TWICE A DAY WITH MEALS 180 tablet 1    pioglitazone (ACTOS) 30 MG tablet TAKE ONE TABLET BY MOUTH DAILY 90 tablet 1    alendronate (FOSAMAX) 70 MG tablet TAKE 1 TABLET BY MOUTH ONCE WEEKLY ON AN EMPTY STOMACH BEFORE BREAKFAST. REMAIN UPRIGHT FOR 30 MINUTES & TAKE WITH 8 OUNCES OF WATER 12 tablet 1    blood glucose test strips (ASCENSIA AUTODISC VI;ONE TOUCH ULTRA TEST VI) strip Test sugars twice daily one touch ultra blue DX: E11.9 100 each 5    BASAGLAR KWIKPEN 100 UNIT/ML injection pen TAKE 5 UNITS TWICE DAILY, HOLD IF BLOOD SUGAR IS LESS THAN 200.  B-D ULTRAFINE III SHORT PEN 31G X 8 MM MISC USE 1 NEEDLE SUBCUTANEOUSLY TWICE A DAY      ONE TOUCH ULTRASOFT LANCETS MISC 1 each by Does not apply route daily E11.9 100 each 3    glimepiride (AMARYL) 2 MG tablet TAKE ONE TABLET BY MOUTH TWICE A  tablet 2    Blood Glucose Monitoring Suppl (ONE TOUCH ULTRA 2) w/Device KIT Test once daily Dx: E11.9 1 kit 0    Melatonin 5 MG CAPS Take 5 mg by mouth nightly as needed (sleep) 1 capsule 0    sitaGLIPtin (JANUVIA) 100 MG tablet Take 100 mg by mouth daily       No current facility-administered medications for this visit. Allergies   Allergen Reactions    Morphine Nausea And Vomiting       REVIEW OF SYSTEMS  Review of Systems   Constitutional: Negative for activity change, appetite change, chills and fever. HENT: Negative for congestion, rhinorrhea and sore throat. Eyes: Negative for visual disturbance. Respiratory: Negative for cough, chest tightness, shortness of breath and wheezing. Cardiovascular: Negative for chest pain and leg swelling. Gastrointestinal: Negative for abdominal pain, diarrhea, nausea and vomiting.    Genitourinary: Negative for dysuria, frequency, hematuria and Refill: 1    4. Pure hypercholesterolemia  Stable. Labs ordered and pending. Compliant with simvastatin 20 mg daily. Continue with current treatment management follow-up in 6 months, sooner for new or worsening symptoms.  - Lipid, Fasting; Future    5. Psychophysiologic insomnia  Stable. Patient denies any new or worsening insomnia. Current use of melatonin. Patient reports doing well. Follow-up in 6 months, sooner for new or worsening symptoms. 6. Vitamin D deficiency disease  Stable. Patient does take vitamin D 2000 units twice a day. Labs ordered and pending we will follow-up with results. Follow-up in 6 mths, sooner for new or worsening symptoms.  - VITAMIN D 25 HYDROXY; Future    7. B12 deficiency  Stable. Patient currently on oral B12 supplement. Patient has taking B12 injections in the past but while in hospice due to Covid care she was taken off of them. I did discuss obtaining vitamin B12 levels for further evaluation. If patient deficient then restarting B12 injections. Patient and family both verbalized acknowledged. - METHYLMALONIC ACID, SERUM; Future    8. Congestive heart failure, unspecified HF chronicity, unspecified heart failure type ([address]  Stable. Asymptomatic. Patient denies any swelling, weight gain, shortness of breath. We did discuss the possibility of discontinuing Lasix depending on lab work and if patient is asymptomatic. I did recommend waiting until blood work obtained for medication adjustments. Patient's daughter verbalized and acknowledges. We did discuss monitoring fluid intake, excessive salt, and checking weight on a daily basis. - furosemide (LASIX) 20 MG tablet; Take 1 tablet by mouth daily  Dispense: 90 tablet; Refill: 1    9. Confusion  Patient daughter reports feeling like she is more confused than usual.  Patient's daughter concerned that she may have UTI. Patient has history of UTIs and reports minimal symptoms other than syncope.   Patient's daughter reports that she did do a at home test several weeks ago and it was positive for UTI so she was given antibiotics they had on hand for treatment. Urinalysis obtained in the office for further evaluation we will follow-up with results and treatment dependent.  - POCT Urinalysis no Micro         The note was completed using Dragon voice recognition transcription. Every effort was made to ensure accuracy; however, inadvertent  transcription errors may be present despite my best efforts to edit errors.     Yoav Ponce, LUIS - CNP

## 2021-06-29 NOTE — PROGRESS NOTES
Blood drawn per physician order. 21 gauge needle used. Location rt ac space w/o incident. Pressure applied until bleeding stopped. Bandaid applied. Patient instructed to call or return if problems with bleeding. 4 tubes drawn.

## 2021-10-21 NOTE — TELEPHONE ENCOUNTER
Future appt scheduled 12/28/21 Last appt 6/28/21  Refilled medication per verbal order from provider.

## 2021-12-27 NOTE — PROGRESS NOTES
CHIEF COMPLAINT  Chief Complaint   Patient presents with    Check-Up     DM2        HPI   Anil Jules is a 82 y.o. female who presents to the office 6-month checkup. Presents today with family member for checkup. Patient reports doing well. No recent changes in medical history. No recent illnesses, unusual fatigue or unexplained weight loss. Patient denies any chest pain, tightness, palpitation or shortness of breath. No abdominal pain or discomfort. No nausea, vomiting, diarrhea. Patient reports that she did have some right-sided low back pain recently but it has improved. Patient reports occasional itching all over but daughter contributes it to dry skin. Patient does check blood sugar at least once a week. Patient denies any dysuria, hematuria, urgency, frequency or retention. No other complaints, modifying factors or associated symptoms. Nursing notes reviewed.    Past Medical History:   Diagnosis Date    Bacteremia 01/19/2019    E coli    Diabetes mellitus ([facility]     Hyperlipidemia     Hypertension     Lower GI bleed 08/08/2017    [address]; biopsy nonspecific colitis    Subdural hematoma ([facility] 12/14    ?syncopal spell    Urinary tract infection 9/27/2019    Urinary tract infection, site not specified      Past Surgical History:   Procedure Laterality Date    CARPAL TUNNEL RELEASE  1991    CATARACT REMOVAL Bilateral     COLONOSCOPY  2007    normal per pt    COLONOSCOPY  08/31/2017    Colitis    NE OPEN RX DISTAL RADIUS FX, INTRA-ARTICULAR, 3+ FRAG Right 11/8/2018    RIGHT DISTAL RADIUS OPEN REDUCTION INTERNAL FIXATION  -REGIONAL BLOCK- performed by Rom Chapin MD at [address] Right     orif     Family History   Problem Relation Age of Onset    High Blood Pressure Mother     Stroke Mother     Kidney Disease Father     High Blood Pressure Sister     Diabetes Brother     Heart Disease Brother      Social History     Socioeconomic History    Marital status:      Spouse name: Not on file    Number of children: Not on file    Years of education: Not on file    Highest education level: Not on file   Occupational History    Not on file   Tobacco Use    Smoking status: Never Smoker    Smokeless tobacco: Never Used   Substance and Sexual Activity    Alcohol use: No     Alcohol/week: 0.0 standard drinks    Drug use: Not on file    Sexual activity: Not on file   Other Topics Concern    Not on file   Social History Narrative    Not on file     Social Determinants of Health     Financial Resource Strain:     Difficulty of Paying Living Expenses: Not on file   Food Insecurity:     Worried About Running Out of Food in the Last Year: Not on file    Soo of Food in the Last Year: Not on file   Transportation Needs:     Lack of Transportation (Medical): Not on file    Lack of Transportation (Non-Medical):  Not on file   Physical Activity:     Days of Exercise per Week: Not on file    Minutes of Exercise per Session: Not on file   Stress:     Feeling of Stress : Not on file   Social Connections:     Frequency of Communication with Friends and Family: Not on file    Frequency of Social Gatherings with Friends and Family: Not on file    Attends Mandaen Services: Not on file    Active Member of [employer] or Organizations: Not on file    Attends Club or Organization Meetings: Not on file    Marital Status: Not on file   Intimate Partner Violence:     Fear of Current or Ex-Partner: Not on file    Emotionally Abused: Not on file    Physically Abused: Not on file    Sexually Abused: Not on file   Housing Stability:     Unable to Pay for Housing in the Last Year: Not on file    Number of [place] in the Last Year: Not on file    Unstable Housing in the Last Year: Not on file     Current Outpatient Medications   Medication Sig Dispense Refill    Cholecalciferol (VITAMIN D3) 50 MCG (2000 UT) CAPS Take 2,000 Units by mouth 2 times daily      zinc 50 MG CAPS Take by mouth daily      Ascorbic Acid (VITAMIN C) 1000 MG tablet Take 1,000 mg by mouth daily      Cyanocobalamin (B-12) 2500 MCG TABS Take by mouth daily      lisinopril (PRINIVIL;ZESTRIL) 10 MG tablet Take 1 tablet by mouth daily 90 tablet 1    furosemide (LASIX) 20 MG tablet Take 1 tablet by mouth daily 90 tablet 1    alendronate (FOSAMAX) 70 MG tablet Take 1 tablet by  Mouth once weekly on an empty stomach before breakfast .remain upright for 30 mins and take with 8 ounces of water. 12 tablet 0    metFORMIN (GLUCOPHAGE) 1000 MG tablet TAKE ONE TABLET BY MOUTH TWICE A DAY WITH A MEAL 180 tablet 1    pioglitazone (ACTOS) 30 MG tablet Take one tablet by mouth daily 90 tablet 1    simvastatin (ZOCOR) 20 MG tablet TAKE ONE TABLET BY MOUTH ONCE NIGHTLY 90 tablet 1    glimepiride (AMARYL) 2 MG tablet TAKE ONE TABLET BY MOUTH TWICE A  tablet 1    metoprolol succinate (TOPROL XL) 50 MG extended release tablet TAKE 1 TABLET BY MOUTH EVERY DAY 90 tablet 1    blood glucose test strips (ASCENSIA AUTODISC VI;ONE TOUCH ULTRA TEST VI) strip Test sugars twice daily one touch ultra blue DX: E11.9 100 each 5    B-D ULTRAFINE III SHORT PEN 31G X 8 MM MISC USE 1 NEEDLE SUBCUTANEOUSLY TWICE A DAY      ONE TOUCH ULTRASOFT LANCETS MISC 1 each by Does not apply route daily E11.9 100 each 3    Blood Glucose Monitoring Suppl (ONE TOUCH ULTRA 2) w/Device KIT Test once daily Dx: E11.9 1 kit 0    Melatonin 5 MG CAPS Take 5 mg by mouth nightly as needed (sleep) 1 capsule 0    sitaGLIPtin (JANUVIA) 100 MG tablet Take 100 mg by mouth daily       No current facility-administered medications for this visit. Allergies   Allergen Reactions    Morphine Nausea And Vomiting       REVIEW OF SYSTEMS  Review of Systems   Constitutional: Negative for activity change, appetite change, chills and fever. HENT: Negative for congestion, rhinorrhea and sore throat. Eyes: Negative for visual disturbance. Respiratory: Negative for cough, chest tightness, shortness of breath and wheezing. Cardiovascular: Negative for chest pain and leg swelling. Gastrointestinal: Negative for abdominal pain, diarrhea, nausea and vomiting. Genitourinary: Negative for dysuria, frequency, hematuria and urgency. Musculoskeletal: Positive for back pain. Negative for gait problem and myalgias. Skin: Negative for rash. Neurological: Negative for dizziness, weakness, light-headedness, numbness and headaches. Psychiatric/Behavioral: Negative for sleep disturbance. PHYSICAL EXAM  /77   Pulse 65   Wt 159 lb 8 oz (72.3 kg)   SpO2 96%   BMI 25.74 kg/m²   Physical Exam  Vitals reviewed. Constitutional:       General: She is not in acute distress. Appearance: Normal appearance. She is well-developed. She is not diaphoretic. HENT:      Head: Normocephalic and atraumatic. Right Ear: Tympanic membrane normal.      Left Ear: Tympanic membrane normal.      Nose: Nose normal.      Mouth/Throat:      Mouth: Mucous membranes are moist.      Pharynx: Oropharynx is clear. No oropharyngeal exudate or posterior oropharyngeal erythema. Eyes:      General:         Right eye: No discharge. Left eye: No discharge. Pupils: Pupils are equal, round, and reactive to light. Neck:      Vascular: No JVD. Cardiovascular:      Rate and Rhythm: Normal rate. Pulses: Normal pulses. Pulmonary:      Effort: Pulmonary effort is normal. No respiratory distress. Breath sounds: No stridor. No wheezing, rhonchi or rales. Chest:      Chest wall: No tenderness. Abdominal:      General: Bowel sounds are normal. There is no distension. Palpations: Abdomen is soft. Tenderness: There is no abdominal tenderness. There is no guarding. Musculoskeletal:         General: No swelling or deformity. Normal range of motion.       Cervical back: Normal range of motion and neck supple. Feet:      Comments: Monofilament test negative  Skin:     General: Skin is warm and dry. Capillary Refill: Capillary refill takes less than 2 seconds. Coloration: Skin is not pale. Findings: No bruising, lesion or rash. Neurological:      General: No focal deficit present. Mental Status: She is alert and oriented to person, place, and time. Psychiatric:         Mood and Affect: Mood normal.         Behavior: Behavior normal.        ASSESSMENT/PLAN:   1. Type 2 diabetes mellitus without complication, without long-term current use of insulin ([facility])  Stable. Previous A1c 6.5. Patient discontinued use of insulin in September and reports checking blood sugar on a weekly basis. No recent episodes of hyperglycemia or hypoglycemia unless associated with food intake. A1c today 8.0. We did discuss the importance of monitoring food and continue with current treatment at this time. Patient currently on Metformin 1000 mg a day, Actos 30 mg daily, glimepiride 2 mg twice a day, and Januvia 100 mg daily. Continue current treatment management follow-up in 6 months, sooner for new or worsening symptoms.  - HM DIABETES FOOT EXAM  - POCT glycosylated hemoglobin (Hb A1C)    2. Stage 3 chronic kidney disease, unspecified whether stage 3a or 3b CKD ([facility])  Stable. Patient denies any changes in urinary output. Continue current treatment management follow-up in 6 months, sooner for new or worsening symptoms. 3. Benign essential hypertension  Stable. Patient currently on lisinopril and metoprolol. Continue with current treatment management. Patient denies any episodes of dizziness or lightheadedness. Continue to monitor blood pressure and call with abnormal readings. Follow-up in 6 months, sooner for new or worsening symptoms. 4. Pure hypercholesterolemia  Stable. Labs ordered and pending. Compliant with simvastatin 20 mg daily.   Continue with current treatment management follow-up in 6 months, sooner for new or worsening symptoms. 5. Vitamin D deficiency disease   Stable. Patient does take vitamin D 2000 units twice a day. .  Follow-up in 6 mths, sooner for new or worsening symptoms. The note was completed using Dragon voice recognition transcription. Every effort was made to ensure accuracy; however, inadvertent  transcription errors may be present despite my best efforts to edit errors.     Mckayla Sloan, APRN - CNP

## 2021-12-28 NOTE — PATIENT INSTRUCTIONS
Please read the healthy family handout that you were given and share it with your family. Please compare this printed medication list with your medications at home to be sure they are the same. If you have any medications that are different please contact us immediately at [phone]. Also review your allergies that we have listed, these may also include medications that you have not been able to tolerate, make sure everything listed is correct. If you have any allergies that are different please contact us immediately at [phone].

## 2022-01-01 ENCOUNTER — CARE COORDINATION (OUTPATIENT)
Dept: CASE MANAGEMENT | Age: 87
End: 2022-01-01

## 2022-01-01 ENCOUNTER — APPOINTMENT (OUTPATIENT)
Dept: CT IMAGING | Age: 87
End: 2022-01-01
Payer: MEDICARE

## 2022-01-01 ENCOUNTER — HOSPITAL ENCOUNTER (EMERGENCY)
Age: 87
Discharge: HOME | End: 2022-01-11
Attending: EMERGENCY MEDICINE
Payer: MEDICARE

## 2022-01-01 ENCOUNTER — TELEPHONE (OUTPATIENT)
Dept: FAMILY MEDICINE CLINIC | Age: 87
End: 2022-01-01

## 2022-01-01 ENCOUNTER — APPOINTMENT (OUTPATIENT)
Dept: GENERAL RADIOLOGY | Age: 87
End: 2022-01-01
Payer: MEDICARE

## 2022-01-01 ENCOUNTER — VIRTUAL VISIT (OUTPATIENT)
Dept: FAMILY MEDICINE CLINIC | Age: 87
End: 2022-01-01
Payer: MEDICARE

## 2022-01-01 ENCOUNTER — NURSE ONLY (OUTPATIENT)
Dept: FAMILY MEDICINE CLINIC | Age: 87
End: 2022-01-01
Payer: MEDICARE

## 2022-01-01 VITALS
TEMPERATURE: 98.3 F | BODY MASS INDEX: 25.66 KG/M2 | SYSTOLIC BLOOD PRESSURE: 148 MMHG | HEART RATE: 89 BPM | RESPIRATION RATE: 14 BRPM | WEIGHT: 159 LBS | OXYGEN SATURATION: 99 % | DIASTOLIC BLOOD PRESSURE: 65 MMHG

## 2022-01-01 DIAGNOSIS — R41.0 CONFUSION: Primary | ICD-10-CM

## 2022-01-01 DIAGNOSIS — Z20.828 EXPOSURE TO INFLUENZA: Primary | ICD-10-CM

## 2022-01-01 DIAGNOSIS — R05.9 COUGH: Primary | ICD-10-CM

## 2022-01-01 DIAGNOSIS — R31.9 URINARY TRACT INFECTION WITH HEMATURIA, SITE UNSPECIFIED: Primary | ICD-10-CM

## 2022-01-01 DIAGNOSIS — Z20.822 SUSPECTED COVID-19 VIRUS INFECTION: Primary | ICD-10-CM

## 2022-01-01 DIAGNOSIS — N39.0 URINARY TRACT INFECTION WITHOUT HEMATURIA, SITE UNSPECIFIED: ICD-10-CM

## 2022-01-01 DIAGNOSIS — U07.1 COVID-19: ICD-10-CM

## 2022-01-01 DIAGNOSIS — E86.0 DEHYDRATION: ICD-10-CM

## 2022-01-01 DIAGNOSIS — R41.0 CONFUSION: ICD-10-CM

## 2022-01-01 DIAGNOSIS — R55 NEAR SYNCOPE: ICD-10-CM

## 2022-01-01 DIAGNOSIS — Z20.828 EXPOSURE TO INFLUENZA: ICD-10-CM

## 2022-01-01 DIAGNOSIS — Z09 HOSPITAL DISCHARGE FOLLOW-UP: Primary | ICD-10-CM

## 2022-01-01 DIAGNOSIS — I10 BENIGN ESSENTIAL HYPERTENSION: ICD-10-CM

## 2022-01-01 DIAGNOSIS — R55 NEAR SYNCOPE: Primary | ICD-10-CM

## 2022-01-01 DIAGNOSIS — N39.0 URINARY TRACT INFECTION WITH HEMATURIA, SITE UNSPECIFIED: Primary | ICD-10-CM

## 2022-01-01 LAB
ANION GAP SERPL CALCULATED.3IONS-SCNC: 15 MMOL/L (ref 3–16)
BASOPHILS ABSOLUTE: 0 K/UL (ref 0–0.2)
BASOPHILS RELATIVE PERCENT: 0.3 %
BILIRUBIN URINE: NEGATIVE
BILIRUBIN, POC: ABNORMAL
BLOOD URINE, POC: ABNORMAL
BLOOD, URINE: NEGATIVE
BUN BLDV-MCNC: 33 MG/DL (ref 7–20)
CALCIUM SERPL-MCNC: 9.2 MG/DL (ref 8.3–10.6)
CHLORIDE BLD-SCNC: 96 MMOL/L (ref 99–110)
CLARITY, POC: CLEAR
CLARITY: CLEAR
CO2: 21 MMOL/L (ref 21–32)
COLOR, POC: ABNORMAL
COLOR: YELLOW
CREAT SERPL-MCNC: 1 MG/DL (ref 0.6–1.2)
EKG ATRIAL RATE: 85 BPM
EKG DIAGNOSIS: NORMAL
EKG P AXIS: 61 DEGREES
EKG P-R INTERVAL: 210 MS
EKG Q-T INTERVAL: 352 MS
EKG QRS DURATION: 88 MS
EKG QTC CALCULATION (BAZETT): 418 MS
EKG R AXIS: 51 DEGREES
EKG T AXIS: 52 DEGREES
EKG VENTRICULAR RATE: 85 BPM
EOSINOPHILS ABSOLUTE: 0.1 K/UL (ref 0–0.6)
EOSINOPHILS RELATIVE PERCENT: 1.6 %
GFR AFRICAN AMERICAN: >60
GFR NON-AFRICAN AMERICAN: 53
GLUCOSE BLD-MCNC: 249 MG/DL (ref 70–99)
GLUCOSE URINE, POC: ABNORMAL
GLUCOSE URINE: NEGATIVE MG/DL
HCT VFR BLD CALC: 34.5 % (ref 36–48)
HEMOGLOBIN: 11.2 G/DL (ref 12–16)
KETONES, POC: ABNORMAL
KETONES, URINE: 40 MG/DL
LACTIC ACID, SEPSIS: 0.9 MMOL/L (ref 0.4–1.9)
LEUKOCYTE EST, POC: ABNORMAL
LEUKOCYTE ESTERASE, URINE: NEGATIVE
LYMPHOCYTES ABSOLUTE: 0.2 K/UL (ref 1–5.1)
LYMPHOCYTES RELATIVE PERCENT: 2.6 %
MCH RBC QN AUTO: 30.3 PG (ref 26–34)
MCHC RBC AUTO-ENTMCNC: 32.3 G/DL (ref 31–36)
MCV RBC AUTO: 93.6 FL (ref 80–100)
MICROSCOPIC EXAMINATION: ABNORMAL
MONOCYTES ABSOLUTE: 0.3 K/UL (ref 0–1.3)
MONOCYTES RELATIVE PERCENT: 3.9 %
NEUTROPHILS ABSOLUTE: 6.1 K/UL (ref 1.7–7.7)
NEUTROPHILS RELATIVE PERCENT: 91.6 %
NITRITE, POC: POSITIVE
NITRITE, URINE: NEGATIVE
ORGANISM: ABNORMAL
PDW BLD-RTO: 14.4 % (ref 12.4–15.4)
PH UA: 5.5 (ref 5–8)
PH, POC: 5.5
PLATELET # BLD: 177 K/UL (ref 135–450)
PMV BLD AUTO: 9.2 FL (ref 5–10.5)
POTASSIUM REFLEX MAGNESIUM: 4.6 MMOL/L (ref 3.5–5.1)
PROTEIN UA: NEGATIVE MG/DL
PROTEIN, POC: ABNORMAL
RAPID INFLUENZA  B AGN: NEGATIVE
RAPID INFLUENZA A AGN: NEGATIVE
RBC # BLD: 3.68 M/UL (ref 4–5.2)
SARS-COV-2, NAAT: DETECTED
SODIUM BLD-SCNC: 132 MMOL/L (ref 136–145)
SPECIFIC GRAVITY UA: 1.01 (ref 1–1.03)
SPECIFIC GRAVITY, POC: 1.02
TROPONIN: <0.01 NG/ML
URINE CULTURE, ROUTINE: ABNORMAL
URINE TYPE: ABNORMAL
UROBILINOGEN, POC: 0.2
UROBILINOGEN, URINE: 0.2 E.U./DL
WBC # BLD: 6.7 K/UL (ref 4–11)

## 2022-01-01 PROCEDURE — 81002 URINALYSIS NONAUTO W/O SCOPE: CPT | Performed by: NURSE PRACTITIONER

## 2022-01-01 PROCEDURE — 93010 ELECTROCARDIOGRAM REPORT: CPT | Performed by: INTERNAL MEDICINE

## 2022-01-01 PROCEDURE — 73070 X-RAY EXAM OF ELBOW: CPT

## 2022-01-01 PROCEDURE — 80048 BASIC METABOLIC PNL TOTAL CA: CPT

## 2022-01-01 PROCEDURE — 85025 COMPLETE CBC W/AUTO DIFF WBC: CPT

## 2022-01-01 PROCEDURE — 99213 OFFICE O/P EST LOW 20 MIN: CPT | Performed by: NURSE PRACTITIONER

## 2022-01-01 PROCEDURE — 81003 URINALYSIS AUTO W/O SCOPE: CPT

## 2022-01-01 PROCEDURE — 87804 INFLUENZA ASSAY W/OPTIC: CPT

## 2022-01-01 PROCEDURE — 99285 EMERGENCY DEPT VISIT HI MDM: CPT

## 2022-01-01 PROCEDURE — 71045 X-RAY EXAM CHEST 1 VIEW: CPT

## 2022-01-01 PROCEDURE — 70450 CT HEAD/BRAIN W/O DYE: CPT

## 2022-01-01 PROCEDURE — 93005 ELECTROCARDIOGRAM TRACING: CPT | Performed by: EMERGENCY MEDICINE

## 2022-01-01 PROCEDURE — 72125 CT NECK SPINE W/O DYE: CPT

## 2022-01-01 PROCEDURE — 84484 ASSAY OF TROPONIN QUANT: CPT

## 2022-01-01 PROCEDURE — 83605 ASSAY OF LACTIC ACID: CPT

## 2022-01-01 PROCEDURE — 36415 COLL VENOUS BLD VENIPUNCTURE: CPT

## 2022-01-01 PROCEDURE — 87635 SARS-COV-2 COVID-19 AMP PRB: CPT

## 2022-01-01 RX ORDER — SITAGLIPTIN 100 MG/1
TABLET, FILM COATED ORAL
Qty: 14 TABLET | Refills: 0 | Status: SHIPPED | OUTPATIENT
Start: 2022-01-01

## 2022-01-01 RX ORDER — SIMVASTATIN 20 MG
TABLET ORAL
Qty: 90 TABLET | Refills: 1 | Status: SHIPPED | OUTPATIENT
Start: 2022-01-01

## 2022-01-01 RX ORDER — GLIMEPIRIDE 2 MG/1
TABLET ORAL
Qty: 180 TABLET | Refills: 1 | Status: SHIPPED | OUTPATIENT
Start: 2022-01-01

## 2022-01-01 RX ORDER — PIOGLITAZONEHYDROCHLORIDE 30 MG/1
TABLET ORAL
Qty: 90 TABLET | Refills: 1 | Status: SHIPPED | OUTPATIENT
Start: 2022-01-01

## 2022-01-01 RX ORDER — AMOXICILLIN AND CLAVULANATE POTASSIUM 875; 125 MG/1; MG/1
1 TABLET, FILM COATED ORAL 2 TIMES DAILY
Qty: 20 TABLET | Refills: 0 | Status: SHIPPED | OUTPATIENT
Start: 2022-01-01 | End: 2022-01-01 | Stop reason: CLARIF

## 2022-01-01 RX ORDER — NITROFURANTOIN 25; 75 MG/1; MG/1
100 CAPSULE ORAL 2 TIMES DAILY
Qty: 14 CAPSULE | Refills: 0 | Status: SHIPPED | OUTPATIENT
Start: 2022-01-01 | End: 2022-01-01

## 2022-01-01 RX ORDER — AMOXICILLIN 500 MG/1
500 CAPSULE ORAL 3 TIMES DAILY
COMMUNITY
End: 2022-01-01 | Stop reason: ALTCHOICE

## 2022-01-01 RX ORDER — METOPROLOL SUCCINATE 50 MG/1
TABLET, EXTENDED RELEASE ORAL
Qty: 90 TABLET | Refills: 1 | Status: SHIPPED | OUTPATIENT
Start: 2022-01-01

## 2022-01-01 ASSESSMENT — ENCOUNTER SYMPTOMS
COUGH: 1
EYES NEGATIVE: 1
SHORTNESS OF BREATH: 0
GASTROINTESTINAL NEGATIVE: 1

## 2022-01-04 NOTE — TELEPHONE ENCOUNTER
I called to check on status and was informed that we have to resubmit form by mail they will not accept it by fax and then if everything is correct it will take approx 2 weeks to process and mail medication to patient. She does check her sugars daily. Is it ok for her to wait on medication?

## 2022-01-04 NOTE — TELEPHONE ENCOUNTER
Patient is waiting on script for Januvia from patient assistance program.  Daughter said she is completely out.   Is it ok for her to not take it until she gets the medication sent to her because it is really expensive (even with good rx 10 tablets is $170)

## 2022-01-04 NOTE — TELEPHONE ENCOUNTER
Okay to wait on medication as long as she is taking her other diabetic medications and checking glucose daily. If patient begins having episodes of hyperglycemia please have her notify our office.

## 2022-01-06 NOTE — TELEPHONE ENCOUNTER
Daughter states patient has dry cough, weak and some confusion. She was exposed to family member who has the flu. She is wanting patient tested for flu, advised that we could only bring her in the office if she has had negative covid test first.  So advised could send an order for her to get resp panel which would check for both covid and flu or she could take patient to ER.   She requested to have order faxed

## 2022-01-06 NOTE — TELEPHONE ENCOUNTER
Daughter called back and requested to have order faxed to [facility] in [facility] for just the flu test  Order faxed

## 2022-01-12 NOTE — TELEPHONE ENCOUNTER
Patient was in ER last night, tested positive for covid, ER sent her home said she was dehydrated but they did not give her fluids. Lb Kauffman Coordinator concerned because sodium is low, can you review labs and advise what daughter should do for the dehydration? She is weak and they aren't able to lift her. She has drank 24 oz of water since being home. We did make her a virtual appt in the morning Monalisa.

## 2022-01-12 NOTE — CARE COORDINATION
Noted Hyacinth Simmonds, APRN, CNP's response: Sodium is slightly low however not significantly. She is slightly dehydrated however BUN and creatinine are near her normal range. I recommend encouraging fluids with electrolytes such as sugar-free Gatorade, propel, bodyarmor and chicken broth. Also inquired about the Monoclonal antibodies from the CC management team and was informed [facility] still had them as of yesterday but appointments were full. This ACM contacted patient's daughter/caregiver Jessica Griffin) and informed her of above; she verbalized understanding  They will also keep the virtual visit appointment for tomorrow with her PCP.
provided on COVID-19 vaccination as appropriate. Discussed exposure protocols and quarantine with CDC Guidelines. daughter sal/caregiver was given an opportunity to verbalize any questions and concerns and agrees to contact ACM or health care provider for questions related to their healthcare. Reviewed and educated No new medications or changes with current medications noted on any new and changed medications related to discharge diagnosis     Was patient discharged with a pulse oximeter? Yes Discussed and confirmed pulse oximeter discharge instructions and when to notify provider or seek emergency care. ACM provided contact information. Plan for follow-up call in 1-2 days.

## 2022-01-12 NOTE — ED PROVIDER NOTES
CHIEF COMPLAINT  Loss of Consciousness (pt brought to ED via EMS after pt had syncopal episode and became altered) and Altered Mental Status      HISTORY OF PRESENT ILLNESS  Charlene Otto is a 82 y.o. female with a history of type 2 diabetes, hypertension, or hyperlipidemia who is currently being treated for urinary tract infection who presents emergency department for evaluation of near syncopal episodes, mild confusion. She is here with her daughter. According to the daughter, patient is being treated for urinary tract infection. The urine culture recently came back as extended spectrum beta-lactamase E. coli and she was switched on her antibiotics. She has been starting to take this since yesterday. She has been somewhat confused earlier today and was ambulating with a walker however after she had sat down earlier today, she had similar generalized weakness of the bilateral lower extremities and was not able to walk as normal.  She did not fall or hit her head. Patient denies any chest pain. She received a COVID booster today.   Past Medical History:   Diagnosis Date    Bacteremia 01/19/2019    E coli    Diabetes mellitus ([facility] [address]     Hyperlipidemia     Hypertension     Lower GI bleed 08/08/2017    [address]; biopsy nonspecific colitis    Subdural hematoma ([address] 12/14    ?syncopal spell    Urinary tract infection 9/27/2019    Urinary tract infection, site not specified      Past Surgical History:   Procedure Laterality Date    CARPAL TUNNEL RELEASE  1991    CATARACT REMOVAL Bilateral     COLONOSCOPY  2007    normal per pt    COLONOSCOPY  08/31/2017    Colitis    [place] OPEN RX DISTAL RADIUS FX, INTRA-ARTICULAR, 3+ FRAG Right 11/8/2018    RIGHT DISTAL RADIUS OPEN REDUCTION INTERNAL FIXATION  -REGIONAL BLOCK- performed by Yuval Larkin MD at [address] Right     orif     Family History   Problem Relation Age of Onset    High Blood Pressure Mother     Stroke Mother     Kidney Disease Father     High Blood Pressure Sister     Diabetes Brother     Heart Disease Brother      Social History     Socioeconomic History    Marital status:      Spouse name: Not on file    Number of children: Not on file    Years of education: Not on file    Highest education level: Not on file   Occupational History    Not on file   Tobacco Use    Smoking status: Never Smoker    Smokeless tobacco: Never Used   Substance and Sexual Activity    Alcohol use: No     Alcohol/week: 0.0 standard drinks    Drug use: Not on file    Sexual activity: Not on file   Other Topics Concern    Not on file   Social History Narrative    Not on file     Social Determinants of Health     Financial Resource Strain:     Difficulty of Paying Living Expenses: Not on file   Food Insecurity:     Worried About Running Out of Food in the Last Year: Not on file    Soo of Food in the Last Year: Not on file   Transportation Needs:     Lack of Transportation (Medical): Not on file    Lack of Transportation (Non-Medical):  Not on file   Physical Activity:     Days of Exercise per Week: Not on file    Minutes of Exercise per Session: Not on file   Stress:     Feeling of Stress : Not on file   Social Connections:     Frequency of Communication with Friends and Family: Not on file    Frequency of Social Gatherings with Friends and Family: Not on file    Attends Zoroastrianism Services: Not on file    Active Member of Clubs or Organizations: Not on file    Attends Club or Organization Meetings: Not on file    Marital Status: Not on file   Intimate Partner Violence:     Fear of Current or Ex-Partner: Not on file    Emotionally Abused: Not on file    Physically Abused: Not on file    Sexually Abused: Not on file   Housing Stability:     Unable to Pay for Housing in the Last Year: Not on file    Number of [place] in the Last Year: Not on file    Unstable Housing in the Last Year: Not on file     No current facility-administered medications for this encounter. Current Outpatient Medications   Medication Sig Dispense Refill    amoxicillin (AMOXIL) 500 MG capsule Take 500 mg by mouth 3 times daily      SITagliptin (JANUVIA) 100 MG tablet Take 1 tablet by mouth daily 14 tablet 0    Cholecalciferol (VITAMIN D3) 50 MCG (2000 UT) CAPS Take 2,000 Units by mouth 2 times daily      zinc 50 MG CAPS Take by mouth daily      Ascorbic Acid (VITAMIN C) 1000 MG tablet Take 1,000 mg by mouth daily      Cyanocobalamin (B-12) 2500 MCG TABS Take by mouth daily      lisinopril (PRINIVIL;ZESTRIL) 10 MG tablet Take 1 tablet by mouth daily 90 tablet 1    furosemide (LASIX) 20 MG tablet Take 1 tablet by mouth daily 90 tablet 1    alendronate (FOSAMAX) 70 MG tablet Take 1 tablet by  Mouth once weekly on an empty stomach before breakfast .remain upright for 30 mins and take with 8 ounces of water.  12 tablet 0    metFORMIN (GLUCOPHAGE) 1000 MG tablet TAKE ONE TABLET BY MOUTH TWICE A DAY WITH A MEAL 180 tablet 1    pioglitazone (ACTOS) 30 MG tablet Take one tablet by mouth daily 90 tablet 1    simvastatin (ZOCOR) 20 MG tablet TAKE ONE TABLET BY MOUTH ONCE NIGHTLY 90 tablet 1    glimepiride (AMARYL) 2 MG tablet TAKE ONE TABLET BY MOUTH TWICE A  tablet 1    metoprolol succinate (TOPROL XL) 50 MG extended release tablet TAKE 1 TABLET BY MOUTH EVERY DAY 90 tablet 1    blood glucose test strips (ASCENSIA AUTODISC VI;ONE TOUCH ULTRA TEST VI) strip Test sugars twice daily one touch ultra blue DX: E11.9 100 each 5    B-D ULTRAFINE III SHORT PEN 31G X 8 MM MISC USE 1 NEEDLE SUBCUTANEOUSLY TWICE A DAY      ONE TOUCH ULTRASOFT LANCETS MISC 1 each by Does not apply route daily E11.9 100 each 3    Blood Glucose Monitoring Suppl (ONE TOUCH ULTRA 2) w/Device KIT Test once daily Dx: E11.9 1 kit 0    Melatonin 5 MG CAPS Take 5 mg by mouth nightly as needed (sleep) 1 capsule 0     Allergies Allergen Reactions    Morphine Nausea And Vomiting       REVIEW OF SYSTEMS  Positive and pertinent negatives as per HPI. All other systems were reviewed and are negative. PHYSICAL EXAM  BP (!) 161/61   Pulse 94   Temp 98.6 °F (37 °C) (Oral)   Resp 20   Wt 159 lb (72.1 kg)   SpO2 98%   BMI 25.66 kg/m²   GENERAL APPEARANCE: Awake and alert. Cooperative. HEAD: Normocephalic. Atraumatic. EYES: PERRL. EOM's grossly intact. NECK: Supple, trachea midline. No significant lymphadenopathy  HEART: RRR. No harsh murmurs. Intact radial pulses 2+ bilaterally. LUNGS: Respirations unlabored without accessory muscle use. CTAB. Good air exchange. No wheezes, rales, or rhonchi. Speaking comfortably in full sentences. ABDOMEN: Soft. Non-distended. Non-tender. No guarding or rebound. EXTREMITIES: No peripheral edema. No acute deformities. SKIN: Warm and dry. No acute rashes. NEUROLOGICAL: Alert and oriented X 3. No focal deficits cranial nerves II through XII are grossly intact. Intact strength to all 4 extremities. LABS  I have reviewed all labs for this visit.    Results for orders placed or performed during the hospital encounter of 01/11/22   COVID-19, Rapid    Specimen: Nasopharyngeal Swab   Result Value Ref Range    SARS-CoV-2, NAAT DETECTED (AA) Not Detected   Rapid influenza A/B antigens    Specimen: Nasopharyngeal   Result Value Ref Range    Rapid Influenza A Ag Negative Negative    Rapid Influenza B Ag Negative Negative   CBC Auto Differential   Result Value Ref Range    WBC 6.7 4.0 - 11.0 K/uL    RBC 3.68 (L) 4.00 - 5.20 M/uL    Hemoglobin 11.2 (L) 12.0 - 16.0 g/dL    Hematocrit 34.5 (L) 36.0 - 48.0 %    MCV 93.6 80.0 - 100.0 fL    MCH 30.3 26.0 - 34.0 pg    MCHC 32.3 31.0 - 36.0 g/dL    RDW 14.4 12.4 - 15.4 %    Platelets 804 979 - 326 K/uL    MPV 9.2 5.0 - 10.5 fL    Neutrophils % 91.6 %    Lymphocytes % 2.6 %    Monocytes % 3.9 %    Eosinophils % 1.6 %    Basophils % 0.3 %    Neutrophils Absolute 6.1 1.7 - 7.7 K/uL    Lymphocytes Absolute 0.2 (L) 1.0 - 5.1 K/uL    Monocytes Absolute 0.3 0.0 - 1.3 K/uL    Eosinophils Absolute 0.1 0.0 - 0.6 K/uL    Basophils Absolute 0.0 0.0 - 0.2 K/uL   Urinalysis, reflex to microscopic   Result Value Ref Range    Color, UA Yellow Straw/Yellow    Clarity, UA Clear Clear    Glucose, Ur Negative Negative mg/dL    Bilirubin Urine Negative Negative    Ketones, Urine 40 (A) Negative mg/dL    Specific Gravity, UA 1.015 1.005 - 1.030    Blood, Urine Negative Negative    pH, UA 5.5 5.0 - 8.0    Protein, UA Negative Negative mg/dL    Urobilinogen, Urine 0.2 <2.0 E.U./dL    Nitrite, Urine Negative Negative    Leukocyte Esterase, Urine Negative Negative    Microscopic Examination Not Indicated     Urine Type NotGiven    Lactate, Sepsis   Result Value Ref Range    Lactic Acid, Sepsis 0.9 0.4 - 1.9 mmol/L   Basic Metabolic Panel w/ Reflex to MG   Result Value Ref Range    Sodium 132 (L) 136 - 145 mmol/L    Potassium reflex Magnesium 4.6 3.5 - 5.1 mmol/L    Chloride 96 (L) 99 - 110 mmol/L    CO2 21 21 - 32 mmol/L    Anion Gap 15 3 - 16    Glucose 249 (H) 70 - 99 mg/dL    BUN 33 (H) 7 - 20 mg/dL    CREATININE 1.0 0.6 - 1.2 mg/dL    GFR Non- 53 (A) >60    GFR African American >60 >60    Calcium 9.2 8.3 - 10.6 mg/dL   Troponin   Result Value Ref Range    Troponin <0.01 <0.01 ng/mL   EKG 12 Lead   Result Value Ref Range    Ventricular Rate 85 BPM    Atrial Rate 85 BPM    P-R Interval 210 ms    QRS Duration 88 ms    Q-T Interval 352 ms    QTc Calculation (Bazett) 418 ms    P Axis 61 degrees    R Axis 51 degrees    T Axis 52 degrees    Diagnosis       Sinus rhythm with 1st degree A-V blockOtherwise normal ECGWhen compared with ECG of 11-MAR-2021 15:55,No significant change was found       EKG  The Ekg interpreted by myself in the emergency department in the absence of a cardiologist.  normal sinus rhythm with a rate of 85  Axis is   Normal  QTc is  within an acceptable range  Intervals and Durations are unremarkable. No specific ST-T wave changes appreciated. No evidence of acute ischemia. No significant change from prior EKG dated 3/11/21      RADIOLOGY  X-RAYS:  I have reviewed radiologic plain film image(s). ALL OTHER NON-PLAIN FILM IMAGES SUCH AS CT, ULTRASOUND AND MRI HAVE BEEN READ BY THE RADIOLOGIST. XR ELBOW RIGHT (2 VIEWS)   Final Result   No acute osseous injury is identified. RECOMMENDATION:   If there is persistent pain, follow-up x-ray series is recommended in 7-10   days. XR CHEST PORTABLE   Final Result   No acute cardiopulmonary disease. CT CERVICAL SPINE WO CONTRAST   Final Result   No acute fracture or subluxation of the cervical spine. Mild degenerative anterolisthesis of C4-5. Advanced multilevel spondylosis and facet arthropathy. CT HEAD WO CONTRAST   Final Result   No acute intracranial abnormality. Moderate degree of atrophy and low-attenuation in the cerebral white matter,   consistent with small vessel disease. ED COURSE/MDM  Patient seen and evaluated. Old records reviewed. Labs and imaging reviewed and results discussed with patient. 82-year-old female who presents to the emergency department for evaluation of intermittent confusion, generalized weakness. Patient arrives with stable vital signs. She has no respiratory distress, stable vital signs. She has no focal deficits on exam.  We will obtain laboratory evaluation, rapid COVID and flu testing, chest x-ray, CT head. CT head, cervical spine reveals no acute process. Chest x-ray is also unremarkable. X-ray of the elbow was obtained because of her recent fall and there is no traumatic injury seen. Her rapid COVID test is positive. This likely explains some of the patient's symptoms in addition to her current urinary tract infection.   Daughter reports that she seems to be mildly confused at this time however not overtly confused. Patient was able to ambulate with a walker which is at her baseline and did not have hypoxia. She has mild hyponatremia which is likely related to mild dehydration. I had an extensive discussion with the patient's daughter regarding plan of care. Since patient lives with daughter and multiple other family members, they are comfortable with taking care of her at home. They have a home pulse oximeter to monitor her oxygen. They will also follow-up with primary care doctor tomorrow. Advised on return precautions. They expressed understanding with this. The patient will be discharged from the emergency department. The patient was counseled on their diagnosis and any medications prescribed. They were advised on the need for PCP followup. They were counseled on the need to return to the emergency department if any of their symptoms were to worsen, change or have any other concerns. Discharged in stable condition. CLINICAL IMPRESSION  1. Near syncope    2. COVID-19    3. Dehydration        Blood pressure (!) 161/61, pulse 94, temperature 98.6 °F (37 °C), temperature source Oral, resp. rate 20, weight 159 lb (72.1 kg), SpO2 98 %. Wendie Cortés was discharged to home in stable condition. This chart was generated in part by using Dragon Dictation system and may contain errors related to that system including errors in grammar, punctuation, and spelling, as well as words and phrases that may be inappropriate. If there are any questions or concerns please feel free to contact the dictating provider for clarification.      Javi Tran MD  01/11/22 2811

## 2022-01-12 NOTE — TELEPHONE ENCOUNTER
Sodium is slightly low however not significantly. She is slightly dehydrated however BUN and creatinine are near her normal range. I recommend encouraging fluids with electrolytes such as sugar-free Gatorade, propel, bodyarmor and chicken broth.

## 2022-01-13 NOTE — TELEPHONE ENCOUNTER
Future appt scheduled 06/30/2022              Last appt TODAY 01/13/2022      Last Written 01/07/2022    SITagliptin (JANUVIA) 100 MG tablet  #14  0 RF

## 2022-01-13 NOTE — PATIENT INSTRUCTIONS
Please read the healthy family handout that you were given and share it with your family. Please compare this printed medication list with your medications at home to be sure they are the same. If you have any medications that are different please contact us immediately at [phone]. Also review your allergies that we have listed, these may also include medications that you have not been able to tolerate, make sure everything listed is correct. If you have any allergies that are different please contact us immediately at [phone].

## 2022-01-13 NOTE — PROGRESS NOTES
2022    TELEHEALTH EVALUATION -- Audio/Visual [facility]    HPI:    Clarke Corey (:  1935) has requested an audio/video evaluation for the following concern(s): Medical follow-up. Patient was recently seen and treated in the emergency department for a near syncopal episode, dehydration, and diagnosis of COVID-19. Patient presents today via virtual visit with daughter. Patient started approximately 1 week ago with concerns of possible flu due to recent contacts per daughter. Patient was tested for influenza which was negative. Approximately 1 to 2 days later patient's daughter called with concerns of UTI. Urinalysis was obtained and positive for UTI where patient was started on Augmentin at that time. Patient's daughter feels that she started to get better until Tuesday. Patient's daughter reports that she did fall out of bed hitting her right arm and breast.  Patient continues to have mild cough but reports that she is doing better today. Patient eating and drinking appropriately no fever, chills, nausea, vomiting or diarrhea. Patient's daughter reports that she is checking her vital signs frequently. Patient's daughter reports that she still seems slightly confused at times. Review of Systems   Constitutional: Negative. HENT: Negative. Eyes: Negative. Respiratory: Positive for cough. Negative for shortness of breath. Cardiovascular: Negative. Gastrointestinal: Negative. Genitourinary: Negative. Musculoskeletal: Negative. Skin: Negative. Neurological: Negative. Psychiatric/Behavioral: Positive for confusion. Prior to Visit Medications    Medication Sig Taking?  Authorizing Provider   SITagliptin (JANUVIA) 100 MG tablet Take 1 tablet by mouth daily Yes LUIS Paredes CNP   Cholecalciferol (VITAMIN D3) 50 MCG (2000) CAPS Take 2,000 Units by mouth 2 times daily Yes Historical Provider, MD   zinc 50 MG CAPS Take by Drug use: Not on file        Allergies   Allergen Reactions    Morphine Nausea And Vomiting   ,   Past Medical History:   Diagnosis Date    Bacteremia 01/19/2019    E coli    Diabetes mellitus ([facility] [address]     Hyperlipidemia     Hypertension     Lower GI bleed 08/08/2017    [address]; biopsy nonspecific colitis    Subdural hematoma ([facility] [address] 12/14    ?syncopal spell    Urinary tract infection 9/27/2019    Urinary tract infection, site not specified    ,   Past Surgical History:   Procedure Laterality Date    CARPAL TUNNEL RELEASE  1991    CATARACT REMOVAL Bilateral     COLONOSCOPY  2007    normal per pt    COLONOSCOPY  08/31/2017    Colitis    [place] OPEN RX DISTAL RADIUS FX, INTRA-ARTICULAR, 3+ FRAG Right 11/8/2018    RIGHT DISTAL RADIUS OPEN REDUCTION INTERNAL FIXATION  -REGIONAL BLOCK- performed by Terri Simpson MD at [address] Right     orif   ,   Social History     Tobacco Use    Smoking status: Never Smoker    Smokeless tobacco: Never Used   Substance Use Topics    Alcohol use: No     Alcohol/week: 0.0 standard drinks    Drug use: Not on file   ,   Family History   Problem Relation Age of Onset    High Blood Pressure Mother     Stroke Mother     Kidney Disease Father     High Blood Pressure Sister     Diabetes Brother     Heart Disease Brother    ,   Immunization History   Administered Date(s) Administered    COVID-19, J&J, PF, 0.5 mL 04/08/2021    DT (pediatric) 08/08/1975, 05/03/1990, 06/16/2005    Influenza Vaccine, unspecified formulation 11/14/2003, 10/25/2007, 10/30/2008, 10/26/2009, 11/08/2011    Influenza Virus Vaccine 12/11/2009, 10/19/2015, 10/12/2016, 10/08/2018, 09/11/2020    Influenza, High Dose (Fluzone 65 yrs and older) 10/21/2015, 10/12/2016, 10/06/2019    Influenza, High-dose, Quadv, 65 yrs +, IM (Fluzone) 10/12/2021    Influenza, Intradermal, Preservative free 11/01/2012, 10/24/2013, 10/31/2014    Influenza, Loletta Flatten, IM, (6 mo and older Fluzone, Flulaval, Fluarix and 3 yrs and older Afluria) 10/09/2017    Influenza, Quadv, adjuvanted, 65 yrs +, IM, PF (Fluad) 09/14/2020    Pneumococcal Conjugate 13-valent ([id number]) 12/02/2013    Pneumococcal Polysaccharide ([id number]) 06/05/2002, 12/31/2007    Polio OPV 05/03/1990    Tdap (Boostrix, Adacel) 10/15/2014    Zoster Recombinant (Shingrix) 11/13/2020, 02/26/2021   ,   Health Maintenance   Topic Date Due    Diabetic retinal exam  05/25/2017    Annual Wellness Visit (AWV)  Never done    COVID-19 Vaccine (2 - Booster for Bill.Forward series) 06/03/2021    Depression Screen  03/18/2022    A1C test (Diabetic or Prediabetic)  06/28/2022    Lipid screen  06/29/2022    Diabetic foot exam  12/28/2022    Potassium monitoring  01/11/2023    Creatinine monitoring  01/11/2023    DTaP/Tdap/Td vaccine (5 - Td or Tdap) 10/15/2024    DEXA (modify frequency per FRAX score)  Completed    Flu vaccine  Completed    Shingles Vaccine  Completed    Pneumococcal 65+ years Vaccine  Completed    Hepatitis A vaccine  Aged Out    Hib vaccine  Aged Out    Meningococcal (ACWY) vaccine  Aged Out       PHYSICAL EXAMINATION:  [ INSTRUCTIONS:  \"[x]\" Indicates a positive item  \"[]\" Indicates a negative item  -- DELETE ALL ITEMS NOT EXAMINED]  Vital Signs: (As obtained by patient/caregiver or practitioner observation)    Blood pressure-  Heart rate-    Respiratory rate-    Temperature- 98 Pulse oximetry- 96%RA (due to this being a telehealth encounter, evaluation of the following organ system/vital signs are limited, patient's daughter reports temperature, pulse oximeter, and blood glucose 280 during visit.     Constitutional: [x] Appears well-developed and well-nourished [x] No apparent distress      [] Abnormal-   Mental status  [x] Alert and awake  [x] Oriented to person/place/time []Able to follow commands      Eyes:  EOM    [x]  Normal  [] Abnormal-  Sclera  [x]  Normal  [] Abnormal - Discharge [x]  None visible  [] Abnormal -    HENT:   [x] Normocephalic, atraumatic. [] Abnormal   [x] Mouth/Throat: Mucous membranes are moist.     External Ears [x] Normal  [] Abnormal-     Neck: [x] No visualized mass     Pulmonary/Chest: [x] Respiratory effort normal.  [x] No visualized signs of difficulty breathing or respiratory distress        [] Abnormal-      Musculoskeletal:   [] Normal gait with no signs of ataxia         [x] Normal range of motion of neck        [] Abnormal-       Neurological:        [x] No Facial Asymmetry (Cranial nerve 7 motor function) (limited exam to video visit)          [] No gaze palsy        [] Abnormal-         Skin:        [x] No significant exanthematous lesions or discoloration noted on facial skin         [] Abnormal-            Psychiatric:       [x] Normal Affect [x] No Hallucinations        [] Abnormal-     Other pertinent observable physical exam findings-     ASSESSMENT/PLAN:  1. Hospital discharge follow-up  Patient presents via virtual visit for hospital follow-up. Patient was recently seen on 1/11/2022 in the emergency department for evaluation of near syncopal episode, fall, COVID-19, dehydration and UTI. Patient was recently started on antibiotics and daughter reported that she was starting to feel better until Tuesday. Patient daughter reports that she is doing better today. Patient eating and drinking appropriately with no nausea, vomiting, diarrhea. Patient's daughter reports checking her pulse ox on a regular basis 96% on room air this morning. No reports of shortness of breath, dizziness or lightheadedness. Patient's daughter feels that she still is confused at times. I did recommend that patient finish antibiotics for UTI at this time making sure patient is drinking plenty of fluids and monitoring her pulse ox. Patient's daughter aware that if symptoms were to worsen or progress she would need to notify our office immediately.   We did discuss the possibility of postconcussive syndrome in relation to mild confusion. All ER imaging and laboratory studies were reviewed and agree with plan of care at this time. Patient continues to have elevated glucose, this morning to 80. Patient's daughter reports that she restarted Januvia last week. Patient was off it for approximately 2 days waiting on her assistance program with the medications. Patient's daughter reports that her friend owns a pharmacy and was able to give her a 14-day supply. I did recommend that they continue with her current medications calling us if any concerns or episodes of hypoglycemia or hyperglycemia. Patient's daughter verbalized and acknowledges with plan of care at this time. 2. Urinary tract infection without hematuria, site unspecified  See above #1    3. Dehydration  See above #1    4. COVID-19  See above #1    5. Near syncope  See above #1      Return if symptoms worsen or fail to improve. Deng Santoyo, was evaluated through a synchronous (real-time) audio-video encounter. The patient (or guardian if applicable) is aware that this is a billable service. Verbal consent to proceed has been obtained within the past 12 months. The visit was conducted pursuant to the emergency declaration under the [address] [place] [address], [address] [place] [address] authority and the [employer] and [employer] General Act. Patient identification was verified, and a caregiver was present when appropriate. The patient was located in a state where the provider was credentialed to provide care. Total time spent on this encounter: Not billed by time    --LUIS Sanchez CNP on 1/13/2022 at 8:41 AM    An electronic signature was used to authenticate this note.

## 2022-01-14 NOTE — CARE COORDINATION
Ambulatory Care Manager contacted the daughter sal/caregiver by telephone to perform follow-up assessment. Verified name and  with caregiver as identifiers. Patient has following risk factors of: diabetes and HTN/Age. Symptoms reviewed with caregiver who verbalized the following symptoms: no new symptoms and no worsening symptoms. Patient's daughter reports patient is doing much better and doing most of her ADL's by herself. Due to no new or worsening symptoms encounter was not routed to provider for escalation. Educated patient about risk for severe COVID-19 due to risk factors according to CDC guidelines. Harpal Felicity declines review of discharge instructions, medical action plan and red flag symptoms. Discussed COVID vaccination status: No. Education provided on COVID-19 vaccination as appropriate. Discussed exposure protocols and quarantine with CDC Guidelines during last f/u call Caregiver was given an opportunity to verbalize any questions and concerns and agrees to contact ACM or health care provider for questions related to their healthcare. Was patient discharged with a pulse oximeter? Yes; reviewed during last f/u call  Caregiver/daughter denies any questions/concerns    ACM provided contact information. Plan for follow-up call in 3-5 days.

## 2022-01-19 NOTE — CARE COORDINATION
Follow Up Call    Challenges to be reviewed by the provider   Additional needs identified to be addressed with provider: No  none                 Encounter was not routed to provider for escalation. Method of communication with provider:  none. Contacted the Patient's daughter Alina Lance) by telephone to follow up after ED. Status: improved  Interventions to address identified needs: N/A    [facility] follow up appointment(s):   Future Appointments   Date Time Provider Davy Jurado   6/30/2022  9:20 AM [facility] [place]     Non-[facility] follow up appointment(s):    Follow up appointment completed? No.    Provided contact information for future needs. No further follow-up call indicated based on severity of symptoms and risk factors.   Patient's daughter Alina Lance) reports patient doing much better; she denies any needs or concerns  Discussed CC f/u; she declines need for additional f/u  No further outreach scheduled with this ACM; episode of care resolved  Estevan Davis RN
1

## 2022-02-17 RX ORDER — SITAGLIPTIN 100 MG/1
TABLET, FILM COATED ORAL
Qty: 14 TABLET | Refills: 0 | OUTPATIENT
Start: 2022-02-17

## (undated) DEVICE — BANDAGE COBAN 4 IN COMPR W4INXL5YD FOAM COHESIVE QUIK STK SELF ADH SFT

## (undated) DEVICE — SUTURE ETHLN SZ 4-0 L18IN NONABSORBABLE BLK L19MM PS-2 3/8 1667H

## (undated) DEVICE — SOLUTION,SALINE,IRRGATION,500ML,STRL: Brand: MEDLINE

## (undated) DEVICE — BIT DRL DIA2.2MM CROSSLOCK MOD TY DVR ANAT VOLAR PLATING

## (undated) DEVICE — DRAPE EQUIP C ARM MINI 10000100] TIDI PRODUCTS INC]

## (undated) DEVICE — ROYAL SILK SURGICAL GOWN, XL: Brand: CONVERTORS

## (undated) DEVICE — SUTURE COAT VCRL SZ 4-0 L18IN ABSRB UD L19MM PS-2 1/2 CIR J496G

## (undated) DEVICE — STERILE LATEX POWDER-FREE SURGICAL GLOVESWITH NITRILE COATING: Brand: PROTEXIS

## (undated) DEVICE — SPLINT ORTH W3XL12IN LAYERED FBRGLS FOAM PD BRTH BK MOLD

## (undated) DEVICE — Z INACTIVE NO SUPPLIER IDENTIFIED BANDAGE COMPR W3INX5YD SELF CLSR FLAP E SHUR BAND

## (undated) DEVICE — COTTON UNDERCAST PADDING,CRIMPED FINISH: Brand: WEBRIL